# Patient Record
Sex: FEMALE | Race: WHITE | Employment: OTHER | URBAN - METROPOLITAN AREA
[De-identification: names, ages, dates, MRNs, and addresses within clinical notes are randomized per-mention and may not be internally consistent; named-entity substitution may affect disease eponyms.]

---

## 2017-01-13 ENCOUNTER — OFFICE VISIT (OUTPATIENT)
Dept: CARDIOLOGY CLINIC | Age: 77
End: 2017-01-13

## 2017-01-13 VITALS
HEART RATE: 64 BPM | OXYGEN SATURATION: 96 % | DIASTOLIC BLOOD PRESSURE: 70 MMHG | WEIGHT: 106 LBS | SYSTOLIC BLOOD PRESSURE: 120 MMHG | RESPIRATION RATE: 16 BRPM | BODY MASS INDEX: 17.66 KG/M2 | HEIGHT: 65 IN

## 2017-01-13 DIAGNOSIS — Z79.01 CHRONIC ANTICOAGULATION: ICD-10-CM

## 2017-01-13 DIAGNOSIS — D68.51 FACTOR V LEIDEN (HCC): ICD-10-CM

## 2017-01-13 DIAGNOSIS — I10 ESSENTIAL HYPERTENSION: ICD-10-CM

## 2017-01-13 DIAGNOSIS — Z95.818 STATUS POST PLACEMENT OF IMPLANTABLE LOOP RECORDER: Primary | ICD-10-CM

## 2017-01-13 DIAGNOSIS — I63.10 CEREBROVASCULAR ACCIDENT (CVA) DUE TO EMBOLISM OF PRECEREBRAL ARTERY (HCC): ICD-10-CM

## 2017-01-13 NOTE — PROGRESS NOTES
Cardiac Electrophysiology Office Note     Subjective:      Sepideh Duval is a 64 y.o. female patient who was initially seen as consult at [facility] 8/28/16 for new stroke and PAF. Dr Phylliss Spurling is her cardiologist   She is here today for 3 month follow up, she is seen for management of ILR and suspected PAF, hx of CVA. She say she has been doing well. No episodes of fast heart rates, lightheadedness or dizziness. She says this week she has had epigastric burning for 3 days in a row 2 times in the morning after breakfast and yesterday in the afternoon when she was at the movie theater ( no association with food). Burning sensation lasted  For about 30 minutes while at rest. She says TUMs helped relieve the burning. She is not interested in trying a PPI daily. She denies blood in the stool or urine while taking the Eliquis. Past hx:   Hospitalized 8/28/16 at [facility]  MRI 8/30/16 Multifocal areas of acute infarction including left thalamus, left greater than right occipital lobes and right cerebellar hemisphere   She was started on Eliquis in the hospital.  She does have any residual deficits from the three CVAs. She has factor V Leiden mutation.      Past Hx  She and her  said she was on a flight to [place] 5 days ago when she suddenly felt sick, weak, pale and the plane was diverted to [facility]  She thinks she was taken to and kept overnight at [facility]  She was told she had atrial fibrillation transiently and evaluation was ok so sent back home  This morning she felt weak, could not remember things and could not talk clearly so she was referred from [facility], [facility] to ER  Here head CT and neck CT confirmed that she has new acute right occipital and left parietal stroke so Dr Isidro Norton asked me to see her  She had seen Dr Phylliss Spurling in office  She had cardiac cath in 2013 with no CAD, small LAD  LVEF was normal on echo but she has known dilated ascending aorta and moderate AR  She has Factor V Leiden Mutation, hx of uterine cancer and hypertension  She had strokes before June 2007/ November 2015/ 8/2016    Echo 8/30/16 LVEF 55 % to 60 %. No RWMA. Wall thickness was mildly increased. Mild to mod MV. Mod AV. Mid Tr    Patient Active Problem List    Diagnosis Date Noted    Status post placement of implantable loop recorder 11/11/2016    St[facility] 08/28/2016    IPMN (intraductal papillary mucinous neoplasm) 09/02/2015    Spinal stenosis in cervical region 06/26/2014    Chronic constipation 03/06/2014    Cerebral infarction ([facility] 04/13/2012    Hypothyroid 04/13/2012    HTN (hypertension) 09/14/2011     Current Outpatient Prescriptions   Medication Sig Dispense Refill    losartan (COZAAR) 25 mg tablet TAKE 1 TABLET BY MOUTH DAILY. 90 Tab 3    OTHER Pre and probiotic twice a day      MAGNESIUM GLUCONATE 400 mg by Does Not Apply route daily.  apixaban (ELIQUIS) 5 mg tablet Take 1 Tab by mouth two (2) times a day. 60 Tab 6    atorvastatin (LIPITOR) 40 mg tablet Take 1 Tab by mouth nightly. 30 Tab 6    OTHER,NON-FORMULARY, Algenol daily      NATURE-THROID 81.25 mg tab Take 1 Tab by mouth nightly.  b complex vitamins tablet Take 1 Tab by mouth daily.  cholecalciferol (VITAMIN D3) 5,000 unit capsule Take 5,000 Units by mouth daily.        Allergies   Allergen Reactions    Ampicillin Hives    Celebrex [Celecoxib] Hives    Diltiazem Unknown (comments)     Past Medical History   Diagnosis Date    [facility]     Hypertension     Hypothyroid     Migraine     [facility] 6/07     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    Uterine cancer ([facility] 1994     Past Surgical History   Procedure Laterality Date    Pr breast surgery procedure unlisted  4/02, 3/01     L lumpectomy - atypical cells    Hx cataract removal  10/09     right    Hx maile and bso       for uterine cancer     Family History   Problem Relation Age of Onset    Dementia Mother 62    Cancer Father      colon    Diabetes Father      type I    Stroke Sister      Social History   Substance Use Topics    Smoking status: Former Smoker     Packs/day: 25.00     Types: Cigarettes     Quit date: 1/1/1985    Smokeless tobacco: Never Used    Alcohol use 4.8 oz/week     8 Standard drinks or equivalent per week        Review of Systems:   Constitutional: Negative for fever, chills, weight loss, malaise/fatigue. HEENT: Negative for nosebleeds, vision changes. Respiratory: Negative for cough, hemoptysis, sputum production, and wheezing. Cardiovascular: Negative for chest pain, palpitations, orthopnea, claudication, leg swelling, syncope, and PND. Gastrointestinal: Negative for nausea, vomiting, diarrhea, constipation, blood in stool and melena. +GERD  Genitourinary: Negative for dysuria, and hematuria. Musculoskeletal: Negative for myalgias, arthralgia. Skin: Negative for rash. Heme: Does not bleed or bruise easily. Neurological: Negative for speech change and focal weakness     Objective:     Visit Vitals    /70 (BP 1 Location: Left arm, BP Patient Position: Sitting)    Pulse 64    Resp 16    Ht 5' 5\" (1.651 m)    Wt 106 lb (48.1 kg)    SpO2 96%    BMI 17.64 kg/m2      Physical Exam:   Constitutional: thin. No distress. Head: Normocephalic and atraumatic. Eyes: Pupils are equal, round  Neck: supple. No JVD present. Cardiovascular: Normal rate, regular rhythm. Exam reveals no gallop and no friction rub. Pulmonary/Chest: Effort normal and breath sounds normal. No wheezes. Abdominal: Soft flat  Musculoskeletal: no edema. Neurological: alert, oriented. Skin: Skin is warm and dry. ILR scar unremarkable (left of sternum)  Psychiatric: normal mood and affect. Behavior is normal. Judgment and thought content normal.      Assessment/Plan:       ICD-10-CM ICD-9-CM    1. Status post placement of implantable loop recorder Z95.818 V45.09    2.  Cerebrovascular accident (CVA) due to embolism of precerebral artery (HCC) I63.10 434.11    3. Chronic anticoagulation Z79.01 V58.61    4. Essential hypertension I10 401.9    5. Factor V Leiden ([facility] D68.51 289.81      She seems to be doing well, no atrial fibrillation detected thus far on ILR, but her prior stroke appeared to be embolic so will continue to monitor ILR. She is on eliquis for embolic CVA prevention. She is tolerating this without bleeding side effects. BP controlled. She decline PPI at the time for GERD, she would like to take TUMS. Follow-up Disposition:  1 year and monthly ILR monitoring    Thank you for involving me in this patient's care and please call with further concerns or questions. Ashli Meneses M.D.   Electrophysiology/Cardiology  [facility] [place]  [address], [address]  [phone]

## 2017-01-13 NOTE — MR AVS SNAPSHOT
Visit Information Date & Time Provider Department Dept. Phone Encounter #  
 1/13/2017  9:20 AM Nelli Buenrostro MD [facility] [phone] [id number] Your Appointments 6/26/2017 12:00 PM  
ROUTINE CARE with Aftab Burrell MD  
[facility] Appt Note: 6 MONTH F/U  
 [place] [address] [place] [address] [address]  
[address]  
  
    
 7/24/2017 10:00 AM  
ECHO CARDIOGRAMS 2D with ECHO, [facility] ([facility] SCHEDULING) Appt Note: Per Dr. Franci Chapman Echo @10 dx Aortic Regurgitation, See Franci Chapman after  
 [address] [place] [address] [address]  
[address] [facility]  
  
    
 7/24/2017 10:40 AM  
ESTABLISHED PATIENT with Kaylene Fischer MD  
[facility] ([facility] Appt Note: Per Dr. Franci Chapman Echo @10 dx Aortic Regurgitation, See Franci Chapman after  
 [address] [place] [address]  
[address] [address] Upcoming Health Maintenance Date Due COLONOSCOPY 2/1/1958 MEDICARE YEARLY EXAM 3/31/2017 GLAUCOMA SCREENING Q2Y 4/1/2017 Pneumococcal 65+ Low/Medium Risk (2 of 2 - PPSV23) 12/30/2017 DTaP/Tdap/Td series (2 - Td) 5/6/2025 Allergies as of 1/13/2017  Review Complete On: 1/13/2017 By: Milton Shukla Severity Noted Reaction Type Reactions Ampicillin  09/14/2011    Hives Celebrex [Celecoxib]  09/14/2011    Hives Diltiazem  08/28/2016    Unknown (comments) Current Immunizations  Reviewed on 12/2/2013 Name Date Influenza Vaccine  Deferred (Patient Refused) PPD 4/30/2012 Pneumococcal Polysaccharide (PPSV-23)  Deferred (Patient Refused) Tdap 5/6/2015  1:37 PM  
  
 Not reviewed this visit Vitals BP Pulse Resp Height(growth percentile) Weight(growth percentile) SpO2  
 120/70 (BP 1 Location: Left arm, BP Patient Position: Sitting) 64 16 5' 5\" (1.651 m) 106 lb (48.1 kg) 96% BMI OB Status Smoking Status 17.64 kg/m2 Hysterectomy Former Smoker Vitals History BMI and BSA Data Body Mass Index Body Surface Area  
 17.64 kg/m 2 1.49 m 2 Preferred Pharmacy Pharmacy Name Phone [address], [address] [phone] Your Updated Medication List  
  
   
This list is accurate as of: 1/13/17 10:03 AM.  Always use your most recent med list.  
  
  
  
  
 apixaban 5 mg tablet Commonly known as:  Clevester Shawl Take 1 Tab by mouth two (2) times a day. atorvastatin 40 mg tablet Commonly known as:  LIPITOR Take 1 Tab by mouth nightly. b complex vitamins tablet Take 1 Tab by mouth daily. cholecalciferol 5,000 unit capsule Commonly known as:  VITAMIN D3 Take 5,000 Units by mouth daily. losartan 25 mg tablet Commonly known as:  COZAAR  
TAKE 1 TABLET BY MOUTH DAILY. MAGNESIUM GLUCONATE  
400 mg by Does Not Apply route daily. NATURE-THROID 81.25 mg Tab Generic drug:  thyroid (pork) Take 1 Tab by mouth nightly. OTHER Pre and probiotic twice a day OTHER(NON-FORMULARY) Algenol daily Patient Instructions Return to see Dr. Doretha Gonsalves in 1 year. Introducing [facility]! Dear Leodan Chen: Thank you for requesting a [employer] account. Our records indicate that you already have an active [employer] account. You can access your account anytime at [email or web address] Did you know that you can access your hospital and ER discharge instructions at any time in [employer]? You can also review all of your test results from your hospital stay or ER visit. Additional Information If you have questions, please visit the Frequently Asked Questions section of the [employer] website at [email or web address] Remember, [employer] is NOT to be used for urgent needs. For medical emergencies, dial 911. Now available from your iPhone and Android! Please provide this summary of care documentation to your next provider. Your primary care clinician is listed as JORY LAMA. If you have any questions after today's visit, please call [phone].

## 2017-01-13 NOTE — PROGRESS NOTES
Cardiac Electrophysiology Office Note     Subjective:      Niru Ramirez is a 64 y.o. female patient who was initially seen as consult at [facility] 8/28/16 for new stroke and PAF. Dr José Miguel Jaime is her cardiologist    She is here today for 3 month follow up, she is seen for management of ILR and suspected PAF, hx of CVA. She say she has been doing well. No episodes of fast heart rates, lightheadedness or dizziness. She says this week she has had epigastric burning for 3 days in a row 2 times in the morning after breakfast and yesterday in the afternoon when she was at the movie theater ( no association with food). Burning sensation lasted  For about 30 minutes while at rest. She says TUMs helped relieve the burning. She is not interested in trying a PPI daily. She denies blood in the stool or urine while taking the Eliquis. Past hx:   Hospitalized 8/28/16 at [facility]  MRI 8/30/16 Multifocal areas of acute infarction including left thalamus, left greater than right occipital lobes and right cerebellar hemisphere   She was started on Eliquis in the hospital.  She does have any residual deficits from the three CVAs. She has factor V Leiden mutation.      Past Hx  She and her  said she was on a flight to [place] 5 days ago when she suddenly felt sick, weak, pale and the plane was diverted to [facility]  She [facility]  She was told she had atrial fibrillation transiently and evaluation was ok so sent back home  This morning she felt weak, could not remember things and could not talk clearly so she was referred from [facility]  Here head CT and neck CT confirmed that she has new acute right occipital and left parietal stroke so Dr Joaquin Lai asked me to see her  She had seen Dr José Miguel Jaime in office  She had cardiac cath in 2013 with no CAD, small LAD  LVEF was normal on echo but she has known dilated ascending aorta and moderate AR  She has Factor V Leiden Mutation, hx of uterine cancer and hypertension  She had strokes before June 2007/ November 2015/ 8/2016    Echo 8/30/16 LVEF 55 % to 60 %. No RWMA. Wall thickness was mildly increased. Mild to mod MV. Mod AV. Mid Tr    Patient Active Problem List    Diagnosis Date Noted    Status post placement of implantable loop recorder 11/11/2016    Stroke ([facility] 08/28/2016    IPMN (intraductal papillary mucinous neoplasm) 09/02/2015    Spinal stenosis in cervical region 06/26/2014    Chronic constipation 03/06/2014    Cerebral infarction ([facility] 04/13/2012    Hypothyroid 04/13/2012    HTN (hypertension) 09/14/2011     Current Outpatient Prescriptions   Medication Sig Dispense Refill    losartan (COZAAR) 25 mg tablet TAKE 1 TABLET BY MOUTH DAILY. 90 Tab 3    OTHER Pre and probiotic twice a day      MAGNESIUM GLUCONATE 400 mg by Does Not Apply route daily.  apixaban (ELIQUIS) 5 mg tablet Take 1 Tab by mouth two (2) times a day. 60 Tab 6    atorvastatin (LIPITOR) 40 mg tablet Take 1 Tab by mouth nightly. 30 Tab 6    OTHER,NON-FORMULARY, Algenol daily      NATURE-THROID 81.25 mg tab Take 1 Tab by mouth nightly.  b complex vitamins tablet Take 1 Tab by mouth daily.  cholecalciferol (VITAMIN D3) 5,000 unit capsule Take 5,000 Units by mouth daily.        Allergies   Allergen Reactions    Ampicillin Hives    Celebrex [Celecoxib] Hives    Diltiazem Unknown (comments)     Past Medical History   Diagnosis Date    [facility]     Hypertension     Hypothyroid     Migraine     [facility] 6[facility]07     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    Uterine cancer ([facility] 1994     Past Surgical History   Procedure Laterality Date    Pr breast surgery procedure unlisted  4/02, 3/01     L lumpectomy - atypical cells    Hx cataract removal  10/09     right    Hx maile and bso       for uterine cancer     Family History   Problem Relation Age of Onset    Dementia Mother 62    Cancer Father      colon    Diabetes Father      type I    Stroke Sister      Social History   Substance Use Topics    Smoking status: Former Smoker     Packs/day: 25.00     Types: Cigarettes     Quit date: 1/1/1985    Smokeless tobacco: Never Used    Alcohol use 4.8 oz/week     8 Standard drinks or equivalent per week        Review of Systems:   Constitutional: Negative for fever, chills, weight loss, malaise/fatigue. HEENT: Negative for nosebleeds, vision changes. Respiratory: Negative for cough, hemoptysis, sputum production, and wheezing. Cardiovascular: Negative for chest pain, palpitations, orthopnea, claudication, leg swelling, syncope, and PND. Gastrointestinal: Negative for nausea, vomiting, diarrhea, constipation, blood in stool and melena. +GERD  Genitourinary: Negative for dysuria, and hematuria. Musculoskeletal: Negative for myalgias, arthralgia. Skin: Negative for rash. Heme: Does not bleed or bruise easily. Neurological: Negative for speech change and focal weakness     Objective:     Visit Vitals    /70 (BP 1 Location: Left arm, BP Patient Position: Sitting)    Pulse 64    Resp 16    Ht 5' 5\" (1.651 m)    Wt 106 lb (48.1 kg)    SpO2 96%    BMI 17.64 kg/m2      Physical Exam:   Constitutional: well-developed and thin. No distress. Head: Normocephalic and atraumatic. Eyes: Pupils are equal, round  Neck: supple. No JVD present. Cardiovascular: Normal rate, regular rhythm. Exam reveals no gallop and no friction rub. Pulmonary/Chest: Effort normal and breath sounds normal. No wheezes. Abdominal: Soft, no tenderness. Musculoskeletal: no edema. Neurological: alert,oriented. Skin: Skin is warm and dry. ILR scar unremarkable (left of sternum)  Psychiatric: normal mood and affect. Behavior is normal. Judgment and thought content normal.      EKG, old: Sinus  Rhythm  -artifacts  -  Nonspecific T-abnormality.        Assessment/Plan:       ICD-10-CM ICD-9-CM    1. Status post placement of implantable loop recorder Z95.818 V45.09    2. Cerebrovascular accident (CVA) due to embolism of precerebral artery ([facility] I63.10 434.11    3. Chronic anticoagulation Z79.01 V58.61    4. Essential hypertension I10 401.9    5. Factor V Leiden ([facility] D68.51 289.81        She seems to be doing well, no atrial fibrillation detected thus far on ILR, will continue to monitor ILR. She is on eliquis for embolic CVA prevention. She is tolerating this without bleeding side effects. BP controlled. She decline PPI at the time for GERD, she would like to take TUMS. Follow-up Disposition:  1 year and monthly ILR monitoring    Thank you for involving me in this patient's care and please call with further concerns or questions. Wolfgang Merlin, M.D.   Electrophysiology/Cardiology  [facility] [place]  [facility], [facility] [address] [place] [address]  [phone]

## 2017-01-17 ENCOUNTER — OFFICE VISIT (OUTPATIENT)
Dept: INTERNAL MEDICINE CLINIC | Age: 77
End: 2017-01-17

## 2017-01-17 VITALS
WEIGHT: 106.8 LBS | TEMPERATURE: 98.5 F | HEIGHT: 65 IN | BODY MASS INDEX: 17.79 KG/M2 | RESPIRATION RATE: 17 BRPM | OXYGEN SATURATION: 95 % | HEART RATE: 85 BPM

## 2017-01-17 DIAGNOSIS — K21.9 GASTROESOPHAGEAL REFLUX DISEASE WITHOUT ESOPHAGITIS: ICD-10-CM

## 2017-01-17 DIAGNOSIS — K59.09 CHRONIC CONSTIPATION: Primary | ICD-10-CM

## 2017-01-17 NOTE — PATIENT INSTRUCTIONS

## 2017-01-17 NOTE — PROGRESS NOTES
HISTORY OF PRESENT ILLNESS  Maya So is a 64 y.o. female. HPI  Having reflux/indigestion. Burning in chest for last couple of months. Occurring a couple times a week but none since Wednesday. Has not changed her diet - no known food triggers. Has TUMS to take prn which helps. Feels has some trouble swallowing. Worse in the am.  Feels food stuck in upper esophagus - clears throat a couple of times and then resolves. Does not occur every day. Has cut back on her coffee - now having 1  -2 cups a week. No abdominal pains. Having issues with constipation. Takes Smooth-lax 2 nights a week - has 2 large BM post Smooth-lax. Will have another small BM mid week, then usually requires Smooth-lax again. No BRBPR or melena. Similar bowel pattern for last 2-3 years. Weight is unchanged. Current Outpatient Prescriptions:     losartan (COZAAR) 25 mg tablet, TAKE 1 TABLET BY MOUTH DAILY. , Disp: 90 Tab, Rfl: 3    OTHER, Pre and probiotic twice a day, Disp: , Rfl:     MAGNESIUM GLUCONATE, 400 mg by Does Not Apply route daily. , Disp: , Rfl:     apixaban (ELIQUIS) 5 mg tablet, Take 1 Tab by mouth two (2) times a day., Disp: 60 Tab, Rfl: 6    atorvastatin (LIPITOR) 40 mg tablet, Take 1 Tab by mouth nightly., Disp: 30 Tab, Rfl: 6    OTHER,NON-FORMULARY,, Algenol daily, Disp: , Rfl:     NATURE-THROID 81.25 mg tab, Take 1 Tab by mouth nightly., Disp: , Rfl:     b complex vitamins tablet, Take 1 Tab by mouth daily. , Disp: , Rfl:     cholecalciferol (VITAMIN D3) 5,000 unit capsule, Take 5,000 Units by mouth daily. , Disp: , Rfl:     Visit Vitals    Pulse 85    Temp 98.5 °F (36.9 °C) (Oral)    Resp 17    Ht 5' 5\" (1.651 m)    Wt 106 lb 12.8 oz (48.4 kg)    SpO2 95%    BMI 17.77 kg/m2       ROS  See above  Physical Exam   Constitutional: She appears well-developed and well-nourished. HENT:   Head: Normocephalic and atraumatic. Neck: Neck supple. No thyromegaly present.    Cardiovascular: Normal rate, regular rhythm and normal heart sounds. Exam reveals no gallop and no friction rub. No murmur heard. Pulmonary/Chest: Effort normal and breath sounds normal.   Abdominal: Soft. Bowel sounds are normal. She exhibits no distension and no mass. There is no tenderness. Musculoskeletal: She exhibits no edema. Lymphadenopathy:     She has no cervical adenopathy. Vitals reviewed. ASSESSMENT and PLAN  GERD - tums as needed. Discussed apple cider vineager. To let me know if occurring > 2 times a week. Discussed food triggers and hand out provided. Constipation - controlled, cont same  No orders of the defined types were placed in this encounter.     Follow-up Disposition: Not on File

## 2017-01-17 NOTE — MR AVS SNAPSHOT
Visit Information Date & Time Provider Department Dept. Phone Encounter #  
 1/17/2017  9:20 AM Talon Hernandez MD [facility] [phone] [id number] Your Appointments 6/26/2017 12:00 PM  
ROUTINE CARE with Talon Hernandez MD  
[facility]) Appt Note: 6 MONTH F/U  
 [place] [address] [facility] [address]  
[address]  
  
    
 7/24/2017 10:00 AM  
ECHO CARDIOGRAMS 2D with ECHO, [facility] ([facility] SCHEDULING) Appt Note: Per Dr. Valdez Addison Echo @10 dx Aortic Regurgitation, See Sharvalery Addison after  
 [address] [facility] [address]  
[address] [facility]  
  
    
 7/24/2017 10:40 AM  
ESTABLISHED PATIENT with Vanessa Roger MD  
[facility] ([facility]) Appt Note: Per Dr. Valdez Addison Echo @10 dx Aortic Regurgitation, See Sharlidori Addison after  
 [address] [place] [address]  
[address] [address] Upcoming Health Maintenance Date Due COLONOSCOPY 2/1/1958 MEDICARE YEARLY EXAM 3/31/2017 GLAUCOMA SCREENING Q2Y 4/1/2017 Pneumococcal 65+ Low/Medium Risk (2 of 2 - PPSV23) 12/30/2017 DTaP/Tdap/Td series (2 - Td) 5/6/2025 Allergies as of 1/17/2017  Review Complete On: 1/17/2017 By: Talon Hernandez MD  
  
 Severity Noted Reaction Type Reactions Ampicillin  09/14/2011    Hives Celebrex [Celecoxib]  09/14/2011    Hives Diltiazem  08/28/2016    Unknown (comments) Current Immunizations  Reviewed on 12/2/2013 Name Date Influenza Vaccine  Deferred (Patient Refused) PPD 4/30/2012 Pneumococcal Polysaccharide (PPSV-23)  Deferred (Patient Refused) Tdap 5/6/2015  1:37 PM  
  
 Not reviewed this visit You Were Diagnosed With   
  
 Codes Comments Chronic constipation    -  Primary ICD-10-CM: K59.09 
ICD-9-CM: 564.00 Gastroesophageal reflux disease without esophagitis     ICD-10-CM: K21.9 ICD-9-CM: 530.81 Vitals Pulse Temp Resp Height(growth percentile) Weight(growth percentile) SpO2  
 85 98.5 °F (36.9 °C) (Oral) 17 5' 5\" (1.651 m) 106 lb 12.8 oz (48.4 kg) 95% BMI OB Status Smoking Status 17.77 kg/m2 Hysterectomy Former Smoker Vitals History BMI and BSA Data Body Mass Index Body Surface Area  
 17.77 kg/m 2 1.49 m 2 Preferred Pharmacy Pharmacy Name Phone [address], [address] [facility] [address] [phone] Your Updated Medication List  
  
   
This list is accurate as of: 1/17/17  9:53 AM.  Always use your most recent med list.  
  
  
  
  
 apixaban 5 mg tablet Commonly known as:  Sherlean [place] Take 1 Tab by mouth two (2) times a day. atorvastatin 40 mg tablet Commonly known as:  LIPITOR Take 1 Tab by mouth nightly. b complex vitamins tablet Take 1 Tab by mouth daily. cholecalciferol 5,000 unit capsule Commonly known as:  VITAMIN D3 Take 5,000 Units by mouth daily. losartan 25 mg tablet Commonly known as:  COZAAR  
TAKE 1 TABLET BY MOUTH DAILY. MAGNESIUM GLUCONATE  
400 mg by Does Not Apply route daily. NATURE-THROID 81.25 mg Tab Generic drug:  thyroid (pork) Take 1 Tab by mouth nightly. OTHER Pre and probiotic twice a day OTHER(NON-FORMULARY) Algenol daily Patient Instructions Gastroesophageal Reflux Disease (GERD): Care Instructions Your Care Instructions Gastroesophageal reflux disease (GERD) is the backward flow of stomach acid into the esophagus. The esophagus is the tube that leads from your throat to your stomach. A one-way valve prevents the stomach acid from moving up into this tube. When you have GERD, this valve does not close tightly enough. If you have mild GERD symptoms including heartburn, you may be able to control the problem with antacids or over-the-counter medicine. Changing your diet, losing weight, and making other lifestyle changes can also help reduce symptoms. Follow-up care is a key part of your treatment and safety. Be sure to make and go to all appointments, and call your doctor if you are having problems. Its also a good idea to know your test results and keep a list of the medicines you take. How can you care for yourself at home? · Take your medicines exactly as prescribed. Call your doctor if you think you are having a problem with your medicine. · Your doctor may recommend over-the-counter medicine. For mild or occasional indigestion, antacids, such as Tums, Gaviscon, Mylanta, or Maalox, may help. Your doctor also may recommend over-the-counter acid reducers, such as Pepcid AC, Tagamet HB, Zantac 75, or Prilosec. Read and follow all instructions on the label. If you use these medicines often, talk with your doctor. · Change your eating habits. ¨ Its best to eat several small meals instead of two or three large meals. ¨ After you eat, wait 2 to 3 hours before you lie down. ¨ Chocolate, mint, and alcohol can make GERD worse. ¨ Spicy foods, foods that have a lot of acid (like tomatoes and oranges), and coffee can make GERD symptoms worse in some people. If your symptoms are worse after you eat a certain food, you may want to stop eating that food to see if your symptoms get better. · Do not smoke or chew tobacco. Smoking can make GERD worse. If you need help quitting, talk to your doctor about stop-smoking programs and medicines. These can increase your chances of quitting for good. · If you have GERD symptoms at night, raise the head of your bed 6 to 8 inches by putting the frame on blocks or placing a foam wedge under the head of your mattress. (Adding extra pillows does not work.) · Do not wear tight clothing around your middle. · Lose weight if you need to. Losing just 5 to 10 pounds can help. When should you call for help? Call your doctor now or seek immediate medical care if: 
· You have new or different belly pain. · Your stools are black and tarlike or have streaks of blood. Watch closely for changes in your health, and be sure to contact your doctor if: 
· Your symptoms have not improved after 2 days. · Food seems to catch in your throat or chest. 
Where can you learn more? Go to http://heidy-damir.info/. Enter Y304 in the search box to learn more about \"Gastroesophageal Reflux Disease (GERD): Care Instructions. \" Current as of: August 9, 2016 Content Version: 11.1 © 7960-8516 [employer]. Care instructions adapted under license by [employer] (which disclaims liability or warranty for this information). If you have questions about a medical condition or this instruction, always ask your healthcare professional. [place] [address] any warranty or liability for your use of this information. Introducing \[facility] [place] [facility]\"" & HEALTH SERVICES! Dear Kumar Hoyt: Thank you for requesting a Seamless Receipts account. Our records indicate that you already have an active Seamless Receipts account. You can access your account anytime at [email or web address] Brew [email or web address]. [email or web address]/Button Brew House Did you know that you can access your hospital and ER discharge instructions at any time in Seamless Receipts? You can also review all of your test results from your hospital stay or ER visit. Additional Information If you have questions, please visit the Frequently Asked Questions section of the Seamless Receipts website at [email or web address]://Button Brew [email or web address] EXPO/Button Brew [email or web address]/. Remember, Seamless Receipts is NOT to be used for urgent needs. For medical emergencies, dial 911. Now available from your iPhone and Android! Please provide this summary of care documentation to your next provider. Your primary care clinician is listed as JORY LAMA. If you have any questions after today's visit, please call [phone].

## 2017-01-19 ENCOUNTER — TELEPHONE (OUTPATIENT)
Dept: CARDIOLOGY CLINIC | Age: 77
End: 2017-01-19

## 2017-01-19 NOTE — TELEPHONE ENCOUNTER
Pt calling to speak with Desire Sinclair about a light on her pacer machine. She can be reached at [phone].  [employer]

## 2017-01-20 NOTE — TELEPHONE ENCOUNTER
Spoke to pt regarding her CL for her Linq. She stated that it is only saying [employer] on screen & nothing else. Had pt unplug then send a manual transmission. Everything working fine now.

## 2017-01-26 ENCOUNTER — TELEPHONE (OUTPATIENT)
Dept: INTERNAL MEDICINE CLINIC | Age: 77
End: 2017-01-26

## 2017-01-26 NOTE — TELEPHONE ENCOUNTER
Pt stated the physical therapist has faxed a prescription to continue therapy on both shoulders. Requested that script is reviewed and faxed back to [phone]. Pt has an upcoming appointment on 2.1. 17.              From answering service

## 2017-01-31 ENCOUNTER — TELEPHONE (OUTPATIENT)
Dept: INTERNAL MEDICINE CLINIC | Age: 77
End: 2017-01-31

## 2017-01-31 NOTE — TELEPHONE ENCOUNTER
Kelsi Patrick is calling regarding fax sent yesterday. She would just like clarification on if there is any additional information needed.    Thank you,  Stephany Villeda

## 2017-02-01 NOTE — TELEPHONE ENCOUNTER
YVONNE. We did not receive any fax on this patient. Asked for information to be faxed again, asked if there was any additional information needed, advised that acm is out of the office until next week and gave fax number just in case.

## 2017-02-07 ENCOUNTER — OFFICE VISIT (OUTPATIENT)
Dept: CARDIOLOGY CLINIC | Age: 77
End: 2017-02-07

## 2017-02-07 DIAGNOSIS — Z95.818 STATUS POST PLACEMENT OF IMPLANTABLE LOOP RECORDER: Primary | ICD-10-CM

## 2017-02-15 ENCOUNTER — DOCUMENTATION ONLY (OUTPATIENT)
Dept: CARDIOLOGY CLINIC | Age: 77
End: 2017-02-15

## 2017-02-15 NOTE — PROGRESS NOTES
ILR showed PAF with RVR 2/6/17  Stop losartan and given toprol XL 50 mg every day  Continue to monitor

## 2017-02-17 RX ORDER — METOPROLOL SUCCINATE 50 MG/1
50 TABLET, EXTENDED RELEASE ORAL DAILY
Qty: 30 TAB | Refills: 3 | Status: SHIPPED | OUTPATIENT
Start: 2017-02-17 | End: 2017-06-08 | Stop reason: SDUPTHER

## 2017-02-17 NOTE — PROGRESS NOTES
Spoke with Ms. Hameedy regarding recommendation by Dr. Alejandrina Sloan to d/c losartan and begin Toprol XL 50mg once daily. Prescription sent to pharmacy per verbal order from Dr. Alejandrina Sloan. The patient verbalized understanding and will call our office with any further questions or concerns.

## 2017-03-07 ENCOUNTER — HOSPITAL ENCOUNTER (OUTPATIENT)
Dept: GENERAL RADIOLOGY | Age: 77
Discharge: HOME | End: 2017-03-07
Attending: OTOLARYNGOLOGY
Payer: MEDICARE

## 2017-03-07 DIAGNOSIS — R13.10 DYSPHAGIA: ICD-10-CM

## 2017-03-07 DIAGNOSIS — Z78.9 NONSMOKER: ICD-10-CM

## 2017-03-07 DIAGNOSIS — H60.62 CHRONIC INFECTION OF LEFT EXTERNAL EAR: ICD-10-CM

## 2017-03-07 PROCEDURE — 74220 X-RAY XM ESOPHAGUS 1CNTRST: CPT

## 2017-04-06 ENCOUNTER — OFFICE VISIT (OUTPATIENT)
Dept: INTERNAL MEDICINE CLINIC | Age: 77
End: 2017-04-06

## 2017-04-06 VITALS
OXYGEN SATURATION: 97 % | SYSTOLIC BLOOD PRESSURE: 131 MMHG | HEART RATE: 70 BPM | TEMPERATURE: 98.1 F | RESPIRATION RATE: 16 BRPM | DIASTOLIC BLOOD PRESSURE: 89 MMHG | WEIGHT: 107 LBS | BODY MASS INDEX: 17.83 KG/M2 | HEIGHT: 65 IN

## 2017-04-06 DIAGNOSIS — Z01.818 PREOP EXAMINATION: ICD-10-CM

## 2017-04-06 DIAGNOSIS — H25.9 AGE-RELATED CATARACT OF LEFT EYE, UNSPECIFIED AGE-RELATED CATARACT TYPE: Primary | ICD-10-CM

## 2017-04-06 NOTE — MR AVS SNAPSHOT
Visit Information Date & Time Provider Department Dept. Phone Encounter #  
 4/6/2017 12:20 PM Al Brito MD [facility] [phone] [id number] Your Appointments 6/26/2017 12:00 PM  
ROUTINE CARE with Al Brito MD  
[facility]) Appt Note: 6 MONTH F/U  
 [place] [address] [facility] [address]  
[address]  
  
    
 7/24/2017 10:00 AM  
ECHO CARDIOGRAMS 2D with ECHO, [facility] ([facility]) Appt Note: Per Dr. Gena Arechiga Echo @10 dx Aortic Regurgitation, See [address]a [address] after  
 [address] [facility] [address]  
[address] [facility]  
  
    
 7/24/2017 10:40 AM  
ESTABLISHED PATIENT with Delicia Laguerre MD  
[facility] ([facility]) Appt Note: Per Dr. Gena Arechiga Echo @10 dx Aortic Regurgitation, See [address] after  
 [address] [place] [address]  
[address] [address] Upcoming Health Maintenance Date Due COLONOSCOPY 2/1/1958 MEDICARE YEARLY EXAM 3/31/2017 GLAUCOMA SCREENING Q2Y 4/1/2017 Pneumococcal 65+ Low/Medium Risk (2 of 2 - PPSV23) 12/30/2017 DTaP/Tdap/Td series (2 - Td) 5/6/2025 Allergies as of 4/6/2017  Review Complete On: 4/6/2017 By: Al Brito MD  
  
 Severity Noted Reaction Type Reactions Ampicillin  09/14/2011    Hives Celebrex [Celecoxib]  09/14/2011    Hives Diltiazem  08/28/2016    Unknown (comments) Current Immunizations  Reviewed on 12/2/2013 Name Date Influenza Vaccine  Deferred (Patient Refused) PPD 4/30/2012 Pneumococcal Polysaccharide (PPSV-23)  Deferred (Patient Refused) Tdap 5/6/2015  1:37 PM  
  
 Not reviewed this visit Vitals BP Pulse Temp Resp Height(growth percentile) Weight(growth percentile) 131/89 (BP 1 Location: Left arm, BP Patient Position: Sitting) 70 98.1 °F (36.7 °C) (Oral) 16 5' 5\" (1.651 m) 107 lb (48.5 kg) SpO2 BMI OB Status Smoking Status 97% 17.81 kg/m2 Hysterectomy Former Smoker Vitals History BMI and BSA Data Body Mass Index Body Surface Area  
 17.81 kg/m 2 1.49 m 2 Preferred Pharmacy Pharmacy Name Phone [address], [address] [phone] Your Updated Medication List  
  
   
This list is accurate as of: 4/6/17  1:07 PM.  Always use your most recent med list.  
  
  
  
  
 apixaban 5 mg tablet Commonly known as:  Jennifer Ee Take 1 Tab by mouth two (2) times a day. atorvastatin 40 mg tablet Commonly known as:  LIPITOR Take 1 Tab by mouth nightly. b complex vitamins tablet Take 1 Tab by mouth daily. cholecalciferol 5,000 unit capsule Commonly known as:  VITAMIN D3 Take 5,000 Units by mouth daily. MAGNESIUM GLUCONATE  
400 mg by Does Not Apply route daily. metoprolol succinate 50 mg XL tablet Commonly known as:  TOPROL-XL Take 1 Tab by mouth daily. NATURE-THROID 81.25 mg Tab Generic drug:  thyroid (pork) Take 1 Tab by mouth nightly. OTHER Pre and probiotic twice a day OTHER(NON-FORMULARY) Algenol daily Introducing [facility] & HEALTH SERVICES! Dear Beny Delatorre: Thank you for requesting a Pick1 account. Our records indicate that you already have an active Pick1 account. You can access your account anytime at [email or web address] [email or web address]. [email or web address]/Volar[employer]visors Did you know that you can access your hospital and ER discharge instructions at any time in Pick1? You can also review all of your test results from your hospital stay or ER visit. Additional Information If you have questions, please visit the Frequently Asked Questions section of the Rewalon website at [email or web address] The [email or web address]. [email or web address] Remember, Rewalon is NOT to be used for urgent needs. For medical emergencies, dial 911. Now available from your iPhone and Android! Please provide this summary of care documentation to your next provider. Your primary care clinician is listed as JORY LAMA. If you have any questions after today's visit, please call [phone].

## 2017-04-21 RX ORDER — ATORVASTATIN CALCIUM 40 MG/1
TABLET, FILM COATED ORAL
Qty: 30 TAB | Refills: 11 | Status: SHIPPED | OUTPATIENT
Start: 2017-04-21 | End: 2017-08-21 | Stop reason: SDUPTHER

## 2017-05-17 ENCOUNTER — HOSPITAL ENCOUNTER (OUTPATIENT)
Dept: MAMMOGRAPHY | Age: 77
Discharge: HOME | End: 2017-05-17
Attending: FAMILY MEDICINE
Payer: MEDICARE

## 2017-05-17 DIAGNOSIS — M81.0 OSTEOPOROSIS, POST-MENOPAUSAL: ICD-10-CM

## 2017-05-17 DIAGNOSIS — M48.50XA VERTEBRAL COMPRESSION FRACTURE (HCC): ICD-10-CM

## 2017-05-17 PROCEDURE — 77080 DXA BONE DENSITY AXIAL: CPT

## 2017-05-19 ENCOUNTER — TELEPHONE (OUTPATIENT)
Dept: CARDIOLOGY CLINIC | Age: 77
End: 2017-05-19

## 2017-05-19 NOTE — TELEPHONE ENCOUNTER
Ismaelariel Guidry calling in reference to her [employer] monitor. She stated it was making a lot of noise so she unplugged it. She requested a call back to [phone]. Thanks!

## 2017-05-22 NOTE — TELEPHONE ENCOUNTER
Spoke to pt about her [employer] for her Linq. Went over how it works again since she unplugged it since the light kept coming on. Told her to plug it back in & to put something over the screen if the light is bothering her. Informed her that she doesn't need to do anything with the monitor unless we call & tell her to do a transmission. Pt stated she understands.

## 2017-06-05 ENCOUNTER — OFFICE VISIT (OUTPATIENT)
Dept: INTERNAL MEDICINE CLINIC | Age: 77
End: 2017-06-05

## 2017-06-05 VITALS
SYSTOLIC BLOOD PRESSURE: 140 MMHG | HEIGHT: 65 IN | TEMPERATURE: 97.9 F | HEART RATE: 70 BPM | DIASTOLIC BLOOD PRESSURE: 80 MMHG | WEIGHT: 104 LBS | BODY MASS INDEX: 17.33 KG/M2 | RESPIRATION RATE: 20 BRPM | OXYGEN SATURATION: 99 %

## 2017-06-05 DIAGNOSIS — G47.00 INSOMNIA, UNSPECIFIED TYPE: ICD-10-CM

## 2017-06-05 DIAGNOSIS — K21.9 GASTROESOPHAGEAL REFLUX DISEASE WITHOUT ESOPHAGITIS: Primary | ICD-10-CM

## 2017-06-05 RX ORDER — RANITIDINE 150 MG/1
150 TABLET, FILM COATED ORAL
COMMUNITY
End: 2017-06-16 | Stop reason: ALTCHOICE

## 2017-06-05 NOTE — PROGRESS NOTES
HISTORY OF PRESENT ILLNESS  Maya So is a 76 y.o. female. HPI  Patient presents to the office for evaluation of GERD like symptoms. She reports Last Wednesday after dinner she noticed some burning in her throat area. She reports she had some scotch that night but this is not uncommon for her. She admits to drinking coffee and she likes to have chocolate. It was suggested by one of the nurses at her facility to try the zantac and change her diet. She reports she has done this and she is feeling a little better. She has been having some insomnia as well. She denies seeing blood. No increase of gas. Review of Systems   Gastrointestinal: Positive for heartburn. Negative for abdominal pain, nausea and vomiting. Psychiatric/Behavioral: The patient has insomnia. Blood pressure 140/80, pulse 70, temperature 97.9 °F (36.6 °C), temperature source Oral, resp. rate 20, height 5' 5\" (1.651 m), weight 104 lb (47.2 kg), SpO2 99 %. Physical Exam   Constitutional: She appears well-developed and well-nourished. No distress. Abdominal: Soft. Bowel sounds are normal. She exhibits no distension. There is no tenderness. There is no rebound. ASSESSMENT and PLAN    Maya was seen today for epigastric pain. Diagnoses and all orders for this visit:    Gastroesophageal reflux disease without esophagitis    Insomnia, unspecified type      I gave the patient today an informational sheet about GERD and foods she should avoid. She has been instructed to continue the zantac. She will be coming back next Friday to follow up with Dr. Zurdo Steinberg. At that time she will let her know if symptoms are resolved with medication and holding trigger foods.

## 2017-06-05 NOTE — PROGRESS NOTES
Reviewed record in preparation for visit and have obtained necessary documentation. Identified pt with two pt identifiers(name and ). Health Maintenance Due   Topic    COLONOSCOPY     MEDICARE YEARLY EXAM     GLAUCOMA SCREENING Q2Y          No chief complaint on file. Wt Readings from Last 3 Encounters:   17 104 lb (47.2 kg)   17 107 lb (48.5 kg)   17 106 lb 12.8 oz (48.4 kg)     Temp Readings from Last 3 Encounters:   17 97.9 °F (36.6 °C) (Oral)   17 98.1 °F (36.7 °C) (Oral)   17 98.5 °F (36.9 °C) (Oral)     BP Readings from Last 3 Encounters:   17 131/89   17 120/70   16 120/75     Pulse Readings from Last 3 Encounters:   17 70   17 85   17 64           Learning Assessment:  :     Learning Assessment 2015   PRIMARY LEARNER Patient Patient   HIGHEST LEVEL OF EDUCATION - PRIMARY LEARNER  4 YEARS OF COLLEGE > 4 YEARS OF COLLEGE   BARRIERS PRIMARY LEARNER NONE NONE   CO-LEARNER CAREGIVER No -   PRIMARY LANGUAGE ENGLISH ENGLISH    NEED No -   LEARNER PREFERENCE PRIMARY READING READING   ANSWERED BY patient self   RELATIONSHIP SELF SELF       Depression Screening:  :     PHQ over the last two weeks 2017   Little interest or pleasure in doing things Not at all   Feeling down, depressed or hopeless Not at all   Total Score PHQ 2 0       Fall Risk Assessment:  :     Fall Risk Assessment, last 12 mths 2017   Able to walk? Yes   Fall in past 12 months? No   Fall with injury? -   Number of falls in past 12 months -   Fall Risk Score -       Abuse Screening:  :     Abuse Screening Questionnaire 2017 3/30/2016 2015   Do you ever feel afraid of your partner? N N N   Are you in a relationship with someone who physically or mentally threatens you? N N N   Is it safe for you to go home?  Stacy Castellon       Coordination of Care Questionnaire:  :     1) Have you been to an emergency room, urgent care clinic since your last visit? no   Hospitalized since your last visit? no             2) Have you seen or consulted any other health care providers outside of [address] [place] [address] since your last visit? yes  (Include any pap smears or colon screenings in this section.)    3) Do you have an Advance Directive on file? yes    4) Are you interested in receiving information on Advance Directives? NO      Patient is accompanied by self I have received verbal consent from [address] [place] [address] Street to discuss any/all medical information while they are present in the room.

## 2017-06-05 NOTE — MR AVS SNAPSHOT
Visit Information Date & Time Provider Department Dept. Phone Encounter #  
 6/5/2017  2:00 PM Celia Garza PA-C [facility] [phone] [id number] Your Appointments 6/26/2017 12:00 PM  
ROUTINE CARE with Rosario Lara MD  
[facility]) Appt Note: 6 MONTH F/U  
 [address] [place] [address] [facility] [address]  
[address]  
  
    
 7/24/2017 10:00 AM  
ECHO CARDIOGRAMS 2D with ECHO, [facility] ([facility]) Appt Note: Per Dr. Phong Brown Echo @10 dx Aortic Regurgitation, See Phong Brown after  
 [address] [facility] [address]  
[address] [facility]  
  
    
 7/24/2017 10:40 AM  
ESTABLISHED PATIENT with Manuel Primrose, MD  
[facility] ([facility]) Appt Note: Per Dr. Phong Brown Echo @10 dx Aortic Regurgitation, See Phong Brown after  
 [address] [place] [address]  
[address] [address] Upcoming Health Maintenance Date Due COLONOSCOPY 2/1/1958 MEDICARE YEARLY EXAM 3/31/2017 GLAUCOMA SCREENING Q2Y 4/1/2017 INFLUENZA AGE 9 TO ADULT 8/1/2017 Pneumococcal 65+ Low/Medium Risk (2 of 2 - PPSV23) 12/30/2017 DTaP/Tdap/Td series (2 - Td) 5/6/2025 Allergies as of 6/5/2017  In Progress On: 6/5/2017 By: Sona Leung LPN Severity Noted Reaction Type Reactions Ampicillin  09/14/2011    Hives Celebrex [Celecoxib]  09/14/2011    Hives Diltiazem  08/28/2016    Unknown (comments) Current Immunizations  Reviewed on 12/2/2013 Name Date Influenza Vaccine  Deferred (Patient Refused) PPD 4/30/2012 Pneumococcal Polysaccharide (PPSV-23)  Deferred (Patient Refused) Tdap 5/6/2015  1:37 PM  
  
 Not reviewed this visit Vitals BP Pulse Temp Resp Height(growth percentile) Weight(growth percentile) 140/80 70 97.9 °F (36.6 °C) (Oral) 20 5' 5\" (1.651 m) 104 lb (47.2 kg) SpO2 BMI OB Status Smoking Status 99% 17.31 kg/m2 Hysterectomy Former Smoker Vitals History BMI and BSA Data Body Mass Index Body Surface Area  
 17.31 kg/m 2 1.47 m 2 Preferred Pharmacy Pharmacy Name Phone [address], [address] [phone] Your Updated Medication List  
  
   
This list is accurate as of: 6/5/17  3:18 PM.  Always use your most recent med list.  
  
  
  
  
 apixaban 5 mg tablet Commonly known as:  Cyndia Abbi Take 1 Tab by mouth two (2) times a day. atorvastatin 40 mg tablet Commonly known as:  LIPITOR  
TAKE 1 TABLET BY MOUTH NIGHTLY. b complex vitamins tablet Take 1 Tab by mouth daily. cholecalciferol 5,000 unit capsule Commonly known as:  VITAMIN D3 Take 5,000 Units by mouth daily. MAGNESIUM GLUCONATE  
400 mg by Does Not Apply route daily. metoprolol succinate 50 mg XL tablet Commonly known as:  TOPROL-XL Take 1 Tab by mouth daily. NATURE-THROID 81.25 mg Tab Generic drug:  thyroid (pork) Take 1 Tab by mouth nightly. OTHER Pre and probiotic twice a day OTHER(NON-FORMULARY) Algenol daily ZANTAC 150 mg tablet Generic drug:  raNITIdine Take 150 mg by mouth Before breakfast and dinner. Introducing [facility] & HEALTH SERVICES! Dear Ifeanyi Castro: Thank you for requesting a [email or web address] account. Our records indicate that you already have an active [email or web address] account. You can access your account anytime at [email or web address]. [email or web address]/[employer] Did you know that you can access your hospital and ER discharge instructions at any time in [email or web address]? You can also review all of your test results from your hospital stay or ER visit. Additional Information If you have questions, please visit the Frequently Asked Questions section of the Kiptronichart website at [email or web address] Remember, Solaborate is NOT to be used for urgent needs. For medical emergencies, dial 911. Now available from your iPhone and Android! Please provide this summary of care documentation to your next provider. Your primary care clinician is listed as JORY LAMA. If you have any questions after today's visit, please call [phone].

## 2017-06-09 RX ORDER — METOPROLOL SUCCINATE 50 MG/1
TABLET, EXTENDED RELEASE ORAL
Qty: 30 TAB | Refills: 0 | Status: SHIPPED | OUTPATIENT
Start: 2017-06-09 | End: 2017-07-07 | Stop reason: SDUPTHER

## 2017-06-12 ENCOUNTER — OFFICE VISIT (OUTPATIENT)
Dept: CARDIOLOGY CLINIC | Age: 77
End: 2017-06-12

## 2017-06-12 DIAGNOSIS — Z95.818 STATUS POST PLACEMENT OF IMPLANTABLE LOOP RECORDER: Primary | ICD-10-CM

## 2017-06-16 ENCOUNTER — OFFICE VISIT (OUTPATIENT)
Dept: INTERNAL MEDICINE CLINIC | Age: 77
End: 2017-06-16

## 2017-06-16 VITALS
WEIGHT: 103.6 LBS | HEIGHT: 65 IN | OXYGEN SATURATION: 98 % | RESPIRATION RATE: 16 BRPM | TEMPERATURE: 98.1 F | DIASTOLIC BLOOD PRESSURE: 90 MMHG | SYSTOLIC BLOOD PRESSURE: 136 MMHG | HEART RATE: 71 BPM | BODY MASS INDEX: 17.26 KG/M2

## 2017-06-16 DIAGNOSIS — E78.00 PURE HYPERCHOLESTEROLEMIA: ICD-10-CM

## 2017-06-16 DIAGNOSIS — I10 ESSENTIAL HYPERTENSION: ICD-10-CM

## 2017-06-16 DIAGNOSIS — R63.4 WEIGHT LOSS: Primary | ICD-10-CM

## 2017-06-16 DIAGNOSIS — Z13.39 SCREENING FOR ALCOHOLISM: ICD-10-CM

## 2017-06-16 DIAGNOSIS — E03.9 ACQUIRED HYPOTHYROIDISM: ICD-10-CM

## 2017-06-16 DIAGNOSIS — Z00.00 ROUTINE GENERAL MEDICAL EXAMINATION AT A HEALTH CARE FACILITY: ICD-10-CM

## 2017-06-16 DIAGNOSIS — I63.10 CEREBROVASCULAR ACCIDENT (CVA) DUE TO EMBOLISM OF PRECEREBRAL ARTERY (HCC): ICD-10-CM

## 2017-06-16 DIAGNOSIS — Z71.89 ADVANCE DIRECTIVE DISCUSSED WITH PATIENT: ICD-10-CM

## 2017-06-16 NOTE — PROGRESS NOTES
Health Maintenance Due   Topic Date Due    COLONOSCOPY  02/01/1958    MEDICARE YEARLY EXAM  03/31/2017    GLAUCOMA SCREENING Q2Y  04/01/2017       Chief Complaint   Patient presents with    Sleep Problem    Fall    Weight Loss

## 2017-06-16 NOTE — PATIENT INSTRUCTIONS

## 2017-06-16 NOTE — PROGRESS NOTES
HISTORY OF PRESENT ILLNESS  Maya So is a 76 y.o. female. HPI  Continues with weight loss. Has slowly trended down from 109 to 103 in the last year. Pt feels her baseline weight is 112. Does not feel hungry. Trying to increase diet with healthy foods (2 eggs instead of 1, increased bread on sandwich). Does not snack between meals. Has not lost muscle mass, does not feel weaker. Not sleeping. Last night went to bed at 9:15, fell asleep at 11:30,  Awoke at 4:30. When she cant sleep she stays in bed. Just ordered low dose melatonin to try and help. Dr. Faith Zaragoza ordered thoracic xray showing multiple compression fractures. Constipation - takes smooth-lax. Helps for a couple of days but then stops helping. Drinks plenty of water. Good fruit and fiber in diet. Finds left ear with congestion. Dry mouth. Current Outpatient Prescriptions:     metoprolol succinate (TOPROL-XL) 50 mg XL tablet, TAKE 1 TABLET BY MOUTH DAILY. , Disp: 30 Tab, Rfl: 0    atorvastatin (LIPITOR) 40 mg tablet, TAKE 1 TABLET BY MOUTH NIGHTLY., Disp: 30 Tab, Rfl: 11    OTHER, probiotic once a day, Disp: , Rfl:     MAGNESIUM GLUCONATE, 400 mg by Does Not Apply route daily. , Disp: , Rfl:     apixaban (ELIQUIS) 5 mg tablet, Take 1 Tab by mouth two (2) times a day., Disp: 60 Tab, Rfl: 6    OTHER,NON-FORMULARY,, Algenol daily, Disp: , Rfl:     NATURE-THROID 81.25 mg tab, Take 1 Tab by mouth nightly., Disp: , Rfl:     b complex vitamins tablet, Take 1 Tab by mouth daily. , Disp: , Rfl:     cholecalciferol (VITAMIN D3) 5,000 unit capsule, Take 5,000 Units by mouth every Monday, Wednesday, Friday., Disp: , Rfl:     Visit Vitals    /90 (BP 1 Location: Right arm, BP Patient Position: Sitting)    Pulse 71    Temp 98.1 °F (36.7 °C) (Oral)    Resp 16    Ht 5' 5\" (1.651 m)    Wt 103 lb 9.6 oz (47 kg)    SpO2 98%    BMI 17.24 kg/m2       ROS  See above  Physical Exam   Constitutional: She appears well-developed and well-nourished. HENT:   Head: Normocephalic and atraumatic. Neck: Neck supple. No thyromegaly present. Cardiovascular: Normal rate, regular rhythm and normal heart sounds. Exam reveals no gallop and no friction rub. No murmur heard. Pulmonary/Chest: Effort normal and breath sounds normal.   Abdominal: Soft. Bowel sounds are normal. She exhibits no distension and no mass. There is no tenderness. Musculoskeletal: She exhibits no edema. Lymphadenopathy:     She has no cervical adenopathy. Vitals reviewed. ASSESSMENT and PLAN  Weight loss - looking back, on ly has lost approx 5 lbs in the last year. Discussed increasing protien, healthy snacks. Hyperlipidemia- controlled with medication, repeat labs  Hypothyroid - ? If meds need to be adjusted with weight loss and constipation. Check labs  htn - controlled  H/o CVA - no new symptoms. Continue same meds  Insomnia - will try melatonin. Discussed better sleep etiquette  Orders Placed This Encounter    METABOLIC PANEL, COMPREHENSIVE    LIPID PANEL    TSH 3RD GENERATION    T4, FREE     Follow-up Disposition: Not on File         This is a Subsequent Medicare Annual Wellness Visit providing Personalized Prevention Plan Services (PPPS) (Performed 12 months after initial AWV and PPPS )    I have reviewed the patient's medical history in detail and updated the computerized patient record.      History     Past Medical History:   Diagnosis Date    [facility] Leiden mutation [facility]     Hypertension     Hypothyroid     Migraine     Stroke ([facility] 6/07    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    Uterine cancer ([facility] [facility] 1994      Past Surgical History:   Procedure Laterality Date    BREAST SURGERY PROCEDURE UNLISTED  4/02, 3/01    L lumpectomy - atypical cells    HX CATARACT REMOVAL  10/09    right    HX HENNY AND BSO      for uterine cancer     Current Outpatient Prescriptions   Medication Sig Dispense Refill    metoprolol succinate (TOPROL-XL) 50 mg XL tablet TAKE 1 TABLET BY MOUTH DAILY. 30 Tab 0    atorvastatin (LIPITOR) 40 mg tablet TAKE 1 TABLET BY MOUTH NIGHTLY. 30 Tab 11    OTHER probiotic once a day      MAGNESIUM GLUCONATE 400 mg by Does Not Apply route daily.  apixaban (ELIQUIS) 5 mg tablet Take 1 Tab by mouth two (2) times a day. 60 Tab 6    OTHER,NON-FORMULARY, Algenol daily      NATURE-THROID 81.25 mg tab Take 1 Tab by mouth nightly.  b complex vitamins tablet Take 1 Tab by mouth daily.  cholecalciferol (VITAMIN D3) 5,000 unit capsule Take 5,000 Units by mouth every Monday, Wednesday, Friday. Allergies   Allergen Reactions    Ampicillin Hives    Celebrex [Celecoxib] Hives    Diltiazem Unknown (comments)     Family History   Problem Relation Age of Onset    Dementia Mother 62    Cancer Father      colon    Diabetes Father      type I    Stroke Sister      Social History   Substance Use Topics    Smoking status: Former Smoker     Packs/day: 25.00     Types: Cigarettes     Quit date: 1/1/1985    Smokeless tobacco: Never Used    Alcohol use 4.8 oz/week     8 Standard drinks or equivalent per week     Patient Active Problem List   Diagnosis Code    HTN (hypertension) I10    Cerebral infarction ([address] I63.9    Hypothyroid E03.9    Chronic constipation K59.09    Spinal stenosis in cervical region M48.02    IPMN (intraductal papillary mucinous neoplasm) D49.0    Stroke ([facility] I63.9    Status post placement of implantable loop recorder Z95.818       Depression Risk Factor Screening:     PHQ over the last two weeks 6/16/2017   Little interest or pleasure in doing things Not at all   Feeling down, depressed or hopeless Not at all   Total Score PHQ 2 0     Alcohol Risk Factor Screening: On any occasion during the past 3 months, have you had more than 3 drinks containing alcohol? No    Do you average more than 7 drinks per week?   No      Functional Ability and Level of Safety:     Hearing Loss none    Activities of Daily Living   Self-care. Requires assistance with: no ADLs    Fall Risk     Fall Risk Assessment, last 12 mths 6/16/2017   Able to walk? Yes   Fall in past 12 months? Yes   Fall with injury? No   Number of falls in past 12 months 3   Fall Risk Score 3     Abuse Screen   Patient is not abused    Review of Systems   Pertinent items are noted in HPI. Physical Examination     Evaluation of Cognitive Function:  Mood/affect:  happy  Appearance: age appropriate  Family member/caregiver input: none    See above    Patient Care Team:  Chay Cordoba MD as PCP - General (Internal Medicine)  Lucio Gonzalez MD (Cardiology)  Dennis Guillermo DO (Neurology)  Leigha Yoo RN as Ambulatory Care Navigator (Internal Medicine)  Beth Delatorre MD (Cardiology)    Advice/Referrals/Counseling   Education and counseling provided:  Has an advanced directive at home. Assessment/Plan    - utd  Orders Placed This Encounter    METABOLIC PANEL, COMPREHENSIVE    LIPID PANEL    TSH 3RD GENERATION    T4, FREE     Follow-up Disposition: Not on File.

## 2017-06-18 PROBLEM — E78.00 PURE HYPERCHOLESTEROLEMIA: Status: ACTIVE | Noted: 2017-06-18

## 2017-06-27 RX ORDER — APIXABAN 5 MG/1
TABLET, FILM COATED ORAL
Qty: 60 TAB | Refills: 6 | Status: SHIPPED | OUTPATIENT
Start: 2017-06-27 | End: 2018-02-05 | Stop reason: SDUPTHER

## 2017-06-27 NOTE — TELEPHONE ENCOUNTER
Request for eliquis 5mg twice a day. Last office visit 1/13/17, next office visit no yet made. Refills per verbal order from Dr. Gypsy Ceja.

## 2017-07-07 RX ORDER — METOPROLOL SUCCINATE 50 MG/1
TABLET, EXTENDED RELEASE ORAL
Qty: 30 TAB | Refills: 6 | Status: SHIPPED | OUTPATIENT
Start: 2017-07-07 | End: 2018-01-03 | Stop reason: SDUPTHER

## 2017-07-07 NOTE — TELEPHONE ENCOUNTER
Request for metoprolol 50mg every day. Last office visit 1/13/17, next office visit 7/24/17. Refills per verbal order from Dr. Charmayne Angst.

## 2017-07-24 ENCOUNTER — CLINICAL SUPPORT (OUTPATIENT)
Dept: CARDIOLOGY CLINIC | Age: 77
End: 2017-07-24

## 2017-07-24 ENCOUNTER — OFFICE VISIT (OUTPATIENT)
Dept: CARDIOLOGY CLINIC | Age: 77
End: 2017-07-24

## 2017-07-24 VITALS
WEIGHT: 106 LBS | HEIGHT: 65 IN | BODY MASS INDEX: 17.66 KG/M2 | SYSTOLIC BLOOD PRESSURE: 140 MMHG | DIASTOLIC BLOOD PRESSURE: 74 MMHG | RESPIRATION RATE: 16 BRPM

## 2017-07-24 DIAGNOSIS — I10 ESSENTIAL HYPERTENSION: ICD-10-CM

## 2017-07-24 DIAGNOSIS — Z87.891 HISTORY OF TOBACCO USE: ICD-10-CM

## 2017-07-24 DIAGNOSIS — I10 BENIGN ESSENTIAL HTN: ICD-10-CM

## 2017-07-24 DIAGNOSIS — I35.1 MODERATE AORTIC REGURGITATION: Primary | ICD-10-CM

## 2017-07-24 DIAGNOSIS — I48.0 PAF (PAROXYSMAL ATRIAL FIBRILLATION) (HCC): Primary | ICD-10-CM

## 2017-07-24 DIAGNOSIS — E78.00 PURE HYPERCHOLESTEROLEMIA: ICD-10-CM

## 2017-07-24 DIAGNOSIS — Z86.73 HISTORY OF STROKE: ICD-10-CM

## 2017-07-24 DIAGNOSIS — Z95.818 STATUS POST PLACEMENT OF IMPLANTABLE LOOP RECORDER: ICD-10-CM

## 2017-07-24 NOTE — PROGRESS NOTES
[facility] [place]: Randi Goss  [phone]    History of Present Illness:   Ms. Lashaun Phipps is a 75 yo F with remote history of tobacco, CVA in 11/2015 with at the time right sided deficits, hypertension, seen for moderate to severe aortic regurgitation on echo. 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 It was thought at that time that she may have had a stress induced cardiomyopathy, as she had some hypokinesis of the apex. On her echocardiogram in 12/2015, she did not have any wall motion abnormality and had preserved LV function. Since her last visit, she continues to do well. No chest pain, shortness of breath, palpitations or syncope. She did have an episode of dizziness last week where she felt things spinning that lasted a few hours. There were no particular triggers. She has had vertigo in the past, but noted this was a little bit different. Repeat echocardiogram today demonstrated normal LV function and her aortic regurgitation had decreased from moderate to mild to moderate. She is compensated on exam with clear lungs and no lower extremity edema, I/VI systolic murmur at the left upper sternal border. Her aortic root was also minimally dilated at 4 cm and we discussed this. Soc hx. Tob quit 27 yo.  [facility]. Assessment and Plan:   1. Aortic regurgitation. Mild to moderate in severity and she continues asymptomatic. Will likely repeat echocardiogram in two years. Follow up in one year. 2. Essential hypertension. Blood pressure is controlled and no changes made. 3. Remote tobacco use. 4. History of CVA in 11/2015. She  has a past medical history of Factor V Leiden mutation ([facility] [address] Hypertension; Hypothyroid; Migraine; Stroke ([facility] [address] (6/07); and Uterine cancer ([facility] [address] (1994). All other systems negative except as above.      PE  Vitals:    07/24/17 1036   BP: 140/74   Resp: 16   Weight: 106 lb (48.1 kg)   Height: 5' 5\" (1.651 m)    Body mass index is 17.64 kg/(m^2).    General appearance - alert, well appearing, and in no distress  Mental status - affect appropriate to mood  Eyes - sclera anicteric, moist mucous membranes  Neck - supple, no JVD  Chest - clear to auscultation, no wheezes, rales or rhonchi  Heart - normal rate, regular rhythm, normal S1, S2, I-II/VI systolic murmur LUSB  Abdomen - soft, nontender, nondistended, no masses or organomegaly  Extremities - peripheral pulses normal, no pedal edema      Recent Labs:  Lab Results   Component Value Date/Time    Cholesterol, total 172 08/28/2016 09:23 AM    HDL Cholesterol 60 08/28/2016 09:23 AM    LDL cholesterol See Note 01/14/2010 08:37 AM    LDL, calculated 92.6 08/28/2016 09:23 AM    Triglyceride 97 08/28/2016 09:23 AM    CHOL/HDL Ratio 2.9 08/28/2016 09:23 AM     Lab Results   Component Value Date/Time    Creatinine (POC) 0.5 08/28/2016 08:47 AM    Creatinine 0.64 10/31/2016 12:22 PM     Lab Results   Component Value Date/Time    BUN 18 10/31/2016 12:22 PM    BUN (POC) 13 08/28/2016 08:47 AM     Lab Results   Component Value Date/Time    Potassium 4.8 10/31/2016 12:22 PM     Lab Results   Component Value Date/Time    Hemoglobin A1c 5.2 08/28/2016 09:28 AM     Lab Results   Component Value Date/Time    Hemoglobin (POC) 13.3 08/28/2016 08:47 AM    HGB 13.3 10/31/2016 12:22 PM     Lab Results   Component Value Date/Time    PLATELET 916 57/39/8591 12:22 PM       Reviewed:  Past Medical History:   Diagnosis Date    [facility]     Hypertension     Hypothyroid     Migraine     Stroke ([facility] 6/07    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    Uterine cancer ([facility] 1994     History   Smoking Status    Former Smoker    Packs/day: 25.00    Types: Cigarettes    Quit date: 1/1/1985   Smokeless Tobacco    Never Used     History   Alcohol Use    4.8 oz/week    8 Standard drinks or equivalent per week     Allergies Allergen Reactions    Ampicillin Hives    Celebrex [Celecoxib] Hives    Diltiazem Unknown (comments)       Current Outpatient Prescriptions   Medication Sig    metoprolol succinate (TOPROL-XL) 50 mg XL tablet TAKE 1 TABLET BY MOUTH DAILY.  ELIQUIS 5 mg tablet TAKE 1 TABLET BY MOUTH 2 TIMES A DAY.  atorvastatin (LIPITOR) 40 mg tablet TAKE 1 TABLET BY MOUTH NIGHTLY.  OTHER probiotic once a day    MAGNESIUM GLUCONATE 400 mg by Does Not Apply route daily.  OTHER,NON-FORMULARY, Algenol daily    NATURE-THROID 81.25 mg tab Take 1 Tab by mouth nightly.  b complex vitamins tablet Take 1 Tab by mouth daily.  cholecalciferol (VITAMIN D3) 5,000 unit capsule Take 5,000 Units by mouth every Monday, Wednesday, Friday. No current facility-administered medications for this visit.         MD Shelli Zarate  [facility] [place]  [address] [facility] [address]  [address]

## 2017-07-24 NOTE — MR AVS SNAPSHOT
Visit Information Date & Time Provider Department Dept. Phone Encounter #  
 7/24/2017 10:40 AM Josselyn Bernard MD [facility] Demarco Encarnacion [phone] [id number] Upcoming Health Maintenance Date Due COLONOSCOPY 2/1/1958 GLAUCOMA SCREENING Q2Y 4/1/2017 INFLUENZA AGE 9 TO ADULT 8/1/2017 Pneumococcal 65+ Low/Medium Risk (2 of 2 - PPSV23) 12/30/2017 MEDICARE YEARLY EXAM 6/17/2018 DTaP/Tdap/Td series (2 - Td) 5/6/2025 Allergies as of 7/24/2017  Review Complete On: 7/24/2017 By: Kate Bey Severity Noted Reaction Type Reactions Ampicillin  09/14/2011    Hives Celebrex [Celecoxib]  09/14/2011    Hives Diltiazem  08/28/2016    Unknown (comments) Current Immunizations  Reviewed on 12/2/2013 Name Date Influenza Vaccine  Deferred (Patient Refused) PPD 4/30/2012 Pneumococcal Polysaccharide (PPSV-23)  Deferred (Patient Refused) Tdap 5/6/2015  1:37 PM  
  
 Not reviewed this visit Vitals BP Resp Height(growth percentile) Weight(growth percentile) BMI OB Status 140/74 (BP 1 Location: Left arm, BP Patient Position: Sitting) 16 5' 5\" (1.651 m) 106 lb (48.1 kg) 17.64 kg/m2 Hysterectomy Smoking Status Former Smoker Vitals History BMI and BSA Data Body Mass Index Body Surface Area  
 17.64 kg/m 2 1.49 m 2 Preferred Pharmacy Pharmacy Name Phone [address], [address] [phone] Your Updated Medication List  
  
   
This list is accurate as of: 7/24/17 11:37 AM.  Always use your most recent med list.  
  
  
  
  
 atorvastatin 40 mg tablet Commonly known as:  LIPITOR  
TAKE 1 TABLET BY MOUTH NIGHTLY. b complex vitamins tablet Take 1 Tab by mouth daily. cholecalciferol 5,000 unit capsule Commonly known as:  VITAMIN D3 Take 5,000 Units by mouth every Monday, Wednesday, Friday. ELIQUIS 5 mg tablet Generic drug:  apixaban TAKE 1 TABLET BY MOUTH 2 TIMES A DAY. MAGNESIUM GLUCONATE  
400 mg by Does Not Apply route daily. metoprolol succinate 50 mg XL tablet Commonly known as:  TOPROL-XL  
TAKE 1 TABLET BY MOUTH DAILY. NATURE-THROID 81.25 mg Tab Generic drug:  thyroid (pork) Take 1 Tab by mouth nightly. OTHER  
probiotic once a day OTHER(NON-FORMULARY) Algenol daily Introducing \[facility]\"" & HEALTH SERVICES! Dear Tian Oliver: Thank you for requesting a [employer] account. Our records indicate that you already have an active [employer] account. You can access your account anytime at [email or web address] Did you know that you can access your hospital and ER discharge instructions at any time in [employer]? You can also review all of your test results from your hospital stay or ER visit. Additional Information If you have questions, please visit the Frequently Asked Questions section of the [employer] website at [email or web address] Remember, [employer] is NOT to be used for urgent needs. For medical emergencies, dial 911. Now available from your iPhone and Android! Please provide this summary of care documentation to your next provider. Your primary care clinician is listed as JORY LAMA. If you have any questions after today's visit, please call [phone].

## 2017-07-25 PROBLEM — I10 BENIGN ESSENTIAL HTN: Status: ACTIVE | Noted: 2017-07-25

## 2017-07-25 PROBLEM — Z86.73 HISTORY OF STROKE: Status: ACTIVE | Noted: 2017-07-25

## 2017-07-25 PROBLEM — I35.1 MODERATE AORTIC REGURGITATION: Status: ACTIVE | Noted: 2017-07-25

## 2017-07-25 PROBLEM — Z87.891 HISTORY OF TOBACCO USE: Status: ACTIVE | Noted: 2017-07-25

## 2017-08-09 ENCOUNTER — OFFICE VISIT (OUTPATIENT)
Dept: CARDIOLOGY CLINIC | Age: 77
End: 2017-08-09

## 2017-08-09 DIAGNOSIS — Z95.818 STATUS POST PLACEMENT OF IMPLANTABLE LOOP RECORDER: Primary | ICD-10-CM

## 2017-08-21 NOTE — TELEPHONE ENCOUNTER
patient needs 90 day supply.   Last seen: 06/16/17    Requested Prescriptions     Pending Prescriptions Disp Refills    atorvastatin (LIPITOR) 40 mg tablet 90 Tab 3

## 2017-08-22 RX ORDER — ATORVASTATIN CALCIUM 40 MG/1
40 TABLET, FILM COATED ORAL
Qty: 90 TAB | Refills: 3 | Status: SHIPPED | OUTPATIENT
Start: 2017-08-22

## 2017-10-17 ENCOUNTER — TELEPHONE (OUTPATIENT)
Dept: CARDIOLOGY CLINIC | Age: 77
End: 2017-10-17

## 2017-10-17 NOTE — TELEPHONE ENCOUNTER
Mr So \" cannot talk right now\", wife would not be home until after 5. Requested a manual transmission for her [employer] via [id number]. Missing data to be reviewed once received.

## 2017-12-20 ENCOUNTER — OFFICE VISIT (OUTPATIENT)
Dept: CARDIOLOGY CLINIC | Age: 77
End: 2017-12-20

## 2017-12-20 ENCOUNTER — DOCUMENTATION ONLY (OUTPATIENT)
Dept: CARDIOLOGY CLINIC | Age: 77
End: 2017-12-20

## 2017-12-20 ENCOUNTER — TELEPHONE (OUTPATIENT)
Dept: CARDIOLOGY CLINIC | Age: 77
End: 2017-12-20

## 2017-12-20 DIAGNOSIS — Z95.818 STATUS POST PLACEMENT OF IMPLANTABLE LOOP RECORDER: Primary | ICD-10-CM

## 2017-12-20 NOTE — PROGRESS NOTES
Atrial fib with RVR. The mean V rate is 146 to 188 bpm, max reaching 188 to 231 bpm. She is asymptomatic    She is on metoprolol  Need BP check and increase to 100 mg every day    Current Outpatient Prescriptions on File Prior to Visit   Medication Sig Dispense Refill    atorvastatin (LIPITOR) 40 mg tablet Take 1 Tab by mouth nightly. 90 Tab 3    metoprolol succinate (TOPROL-XL) 50 mg XL tablet TAKE 1 TABLET BY MOUTH DAILY. 30 Tab 6    ELIQUIS 5 mg tablet TAKE 1 TABLET BY MOUTH 2 TIMES A DAY. 60 Tab 6    OTHER probiotic once a day      MAGNESIUM GLUCONATE 400 mg by Does Not Apply route daily.  OTHER,NON-FORMULARY, Algenol daily      NATURE-THROID 81.25 mg tab Take 1 Tab by mouth nightly.  b complex vitamins tablet Take 1 Tab by mouth daily.  cholecalciferol (VITAMIN D3) 5,000 unit capsule Take 5,000 Units by mouth every Monday, Wednesday, Friday. No current facility-administered medications on file prior to visit.           Future Appointments  Date Time Provider Eliceo Rodas   12/20/2017 11:45 AM Stew Been REGINO SCHED   1/22/2018 3:45 PM Stew Been REGINO SCHED   7/23/2018 10:40 AM Olya Calvillo  [address]

## 2017-12-20 NOTE — TELEPHONE ENCOUNTER
Atrial fib with RVR. The mean V rate is 146 to 188 bpm, max reaching 188 to 231 bpm. She is asymptomatic     She is on metoprolol  Need BP check and increase to 100 mg every day       Called patient. Spoke with . Verified identity.  states he was recently contacted by someone in our office regarding the changes. States he does not need a new script at this time, they will double up on the 50 mg dose. Also instructed patient to check BP daily and call the office with any problems.

## 2017-12-21 ENCOUNTER — DOCUMENTATION ONLY (OUTPATIENT)
Dept: CARDIOLOGY CLINIC | Age: 77
End: 2017-12-21

## 2017-12-21 ENCOUNTER — TELEPHONE (OUTPATIENT)
Dept: CARDIOLOGY CLINIC | Age: 77
End: 2017-12-21

## 2017-12-21 NOTE — TELEPHONE ENCOUNTER
Patient states she doubled the dosage on her medications but she got very dizzy this morning and wants to know what to do   Phone: [phone]

## 2017-12-21 NOTE — PROGRESS NOTES
ILR showed AFIB RVR episodes (21)    Need appt sooner with Dr Lema Just  Date Time Provider Eliceo Rodas   1/22/2018 3:45 PM REMOTE1, [id number] [address] [facility]   7/23/2018 10:40 AM Maxim Corona  [address]         Current Outpatient Prescriptions on File Prior to Visit   Medication Sig Dispense Refill    atorvastatin (LIPITOR) 40 mg tablet Take 1 Tab by mouth nightly. 90 Tab 3    metoprolol succinate (TOPROL-XL) 50 mg XL tablet TAKE 1 TABLET BY MOUTH DAILY. 30 Tab 6    ELIQUIS 5 mg tablet TAKE 1 TABLET BY MOUTH 2 TIMES A DAY. 60 Tab 6    OTHER probiotic once a day      MAGNESIUM GLUCONATE 400 mg by Does Not Apply route daily.  OTHER,NON-FORMULARY, Algenol daily      NATURE-THROID 81.25 mg tab Take 1 Tab by mouth nightly.  b complex vitamins tablet Take 1 Tab by mouth daily.  cholecalciferol (VITAMIN D3) 5,000 unit capsule Take 5,000 Units by mouth every Monday, Wednesday, Friday. No current facility-administered medications on file prior to visit.

## 2017-12-21 NOTE — TELEPHONE ENCOUNTER
Returned call to patient. Verified identity. Advised patient to check BP and HR before taking the additional dose and hold it and just take her regular scheduled dose if below normal parameters. Advised patient to call if she is feeling dizzy or symptomatic.

## 2017-12-27 ENCOUNTER — TELEPHONE (OUTPATIENT)
Dept: CARDIOLOGY CLINIC | Age: 77
End: 2017-12-27

## 2017-12-27 NOTE — TELEPHONE ENCOUNTER
Called patient. Left a message on her cell phone to call the office to reschedule appointment. Called home number listed, spoke with patients . States his wife was not home.  Asked him to have her call at her earliest convenience to schedule a sooner appointment with Dr. Flori Ewing.

## 2017-12-27 NOTE — TELEPHONE ENCOUNTER
Patient returned my call. Verified identity.  Rescheduled patient to see Dr. Jill Hall 1/3/18 per Dr. Marialuisa Miller request.

## 2017-12-28 ENCOUNTER — TELEPHONE (OUTPATIENT)
Dept: CARDIOLOGY CLINIC | Age: 77
End: 2017-12-28

## 2017-12-28 NOTE — TELEPHONE ENCOUNTER
Received a call from patients pharmacy. States patient is requesting Eliquis too early. She informed the pharmacist that her Eliquis has been increased and she needs a refill. Called to speak with patient, she was not home. Spoke with  who states she has doubled up on Eliquis for about the last week. I informed him to have her stop taking a double dose of Eliquis. Her metoprolol was increased on 12/20/17 and they confused the medication. Advised him to have his wife call the office upon her return. Dr. Sabra Ahumada is aware.  Her next appointment is 1/3/18 with Dr. Sabra Ahumada.

## 2017-12-28 NOTE — TELEPHONE ENCOUNTER
Patient returned my call. Verified identity. Patient states she was taking Eliquis 10 mg BID. Informed patient to please stop taking a double dose and resume 5 mg BID. Reminded her that her Metoprolol has been increased to 100 mg not the Eliquis. Patient acknowledged understanding.

## 2018-01-03 ENCOUNTER — OFFICE VISIT (OUTPATIENT)
Dept: CARDIOLOGY CLINIC | Age: 78
End: 2018-01-03

## 2018-01-03 VITALS
WEIGHT: 107 LBS | RESPIRATION RATE: 16 BRPM | DIASTOLIC BLOOD PRESSURE: 80 MMHG | HEIGHT: 65 IN | SYSTOLIC BLOOD PRESSURE: 120 MMHG | BODY MASS INDEX: 17.83 KG/M2 | HEART RATE: 64 BPM

## 2018-01-03 DIAGNOSIS — I10 BENIGN ESSENTIAL HTN: Primary | ICD-10-CM

## 2018-01-03 RX ORDER — METOPROLOL SUCCINATE 25 MG/1
TABLET, EXTENDED RELEASE ORAL
Qty: 30 TAB | Refills: 11 | Status: SHIPPED | OUTPATIENT
Start: 2018-01-03 | End: 2019-01-11 | Stop reason: SDUPTHER

## 2018-01-03 RX ORDER — METOPROLOL SUCCINATE 25 MG/1
TABLET, EXTENDED RELEASE ORAL
COMMUNITY
End: 2018-01-03 | Stop reason: SDUPTHER

## 2018-01-03 RX ORDER — THYROID, PORCINE 90 MG/1
1 TABLET ORAL DAILY
Refills: 0 | COMMUNITY
Start: 2017-11-30 | End: 2018-04-17

## 2018-01-03 RX ORDER — METOPROLOL SUCCINATE 50 MG/1
TABLET, EXTENDED RELEASE ORAL
Qty: 30 TAB | Refills: 11 | Status: SHIPPED | OUTPATIENT
Start: 2018-01-03 | End: 2019-01-11 | Stop reason: SDUPTHER

## 2018-01-03 NOTE — MR AVS SNAPSHOT
Visit Information Date & Time Provider Department Dept. Phone Encounter #  
 1/3/2018  2:20 PM Marielena Toussaint MD [facility] [phone] [id number] Your Appointments 7/23/2018 10:40 AM  
ESTABLISHED PATIENT with Marielena Toussaint MD  
[facility] ([address]) Appt Note: one year follow up  
 [address] [facility] [address]  
[address] [facility] 7 [address] Upcoming Health Maintenance Date Due COLONOSCOPY 2/1/1958 GLAUCOMA SCREENING Q2Y 4/1/2017 Influenza Age 5 to Adult 8/1/2017 Pneumococcal 65+ Low/Medium Risk (2 of 2 - PPSV23) 12/30/2017 MEDICARE YEARLY EXAM 6/17/2018 DTaP/Tdap/Td series (2 - Td) 5/6/2025 Allergies as of 1/3/2018  Review Complete On: 1/3/2018 By: Shandra Elliott Severity Noted Reaction Type Reactions Ampicillin  09/14/2011    Hives Celebrex [Celecoxib]  09/14/2011    Hives Diltiazem  08/28/2016    Unknown (comments) Current Immunizations  Reviewed on 12/2/2013 Name Date Influenza Vaccine  Deferred (Patient Refused) PPD 4/30/2012 Pneumococcal Polysaccharide (PPSV-23)  Deferred (Patient Refused) Tdap 5/6/2015  1:37 PM  
  
 Not reviewed this visit Vitals BP Pulse Resp Height(growth percentile) Weight(growth percentile) BMI  
 120/80 (BP 1 Location: Left arm, BP Patient Position: Sitting) 64 16 5' 5\" (1.651 m) 107 lb (48.5 kg) 17.81 kg/m2 OB Status Smoking Status Hysterectomy Former Smoker Vitals History BMI and BSA Data Body Mass Index Body Surface Area  
 17.81 kg/m 2 1.49 m 2 Preferred Pharmacy Pharmacy Name Phone [address], [address] [phone] Your Updated Medication List  
  
   
This list is accurate as of: 1/3/18  2:49 PM.  Always use your most recent med list.  
  
  
  
  
 atorvastatin 40 mg tablet Commonly known as:  LIPITOR Take 1 Tab by mouth nightly. b complex vitamins tablet Take 1 Tab by mouth daily. cholecalciferol 5,000 unit capsule Commonly known as:  VITAMIN D3 Take 5,000 Units by mouth every Monday, Wednesday, Friday. ELIQUIS 5 mg tablet Generic drug:  apixaban TAKE 1 TABLET BY MOUTH 2 TIMES A DAY. MAGNESIUM GLUCONATE  
400 mg by Does Not Apply route daily. * TOPROL XL 25 mg XL tablet Generic drug:  metoprolol succinate Take  by mouth. take along with 50 mg tab for a total of 75 mg daily * metoprolol succinate 50 mg XL tablet Commonly known as:  TOPROL-XL  
TAKE 1 TABLET BY MOUTH DAILY. * NATURE-THROID 81.25 mg Tab Generic drug:  thyroid (pork) Take 1 Tab by mouth nightly. * ARMOUR THYROID 90 mg tablet Generic drug:  thyroid (Pork) Take 1 Tab by mouth daily. OTHER  
probiotic once a day OTHER(NON-FORMULARY) Algenol daily * Notice: This list has 4 medication(s) that are the same as other medications prescribed for you. Read the directions carefully, and ask your doctor or other care provider to review them with you. Introducing [facility]! Dear Snehal Oliveira: Thank you for requesting a [employer] account. Our records indicate that you already have an active [employer] account. You can access your account anytime at [email or web address] Did you know that you can access your hospital and ER discharge instructions at any time in [employer]? You can also review all of your test results from your hospital stay or ER visit. Additional Information If you have questions, please visit the Frequently Asked Questions section of the [employer] website at [email or web address] Remember, [employer] is NOT to be used for urgent needs. For medical emergencies, dial 911. Now available from your iPhone and Android! Please provide this summary of care documentation to your next provider. Your primary care clinician is listed as JORY LAMA. If you have any questions after today's visit, please call [phone].

## 2018-01-03 NOTE — PROGRESS NOTES
[facility] [place]: [facility]  [phone]    History of Present Illness:   Ms. Beonit Dove is a 77 yo F with remote history of tobacco, CVA in 11/2015 with at the time right sided deficits, hypertension, seen for moderate to severe aortic regurgitation on echo. 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 It was thought at that time that she may have had a stress induced cardiomyopathy, as she had some hypokinesis of the apex. On her echocardiogram in 12/2015, she did not have any wall motion abnormality and had preserved LV function. On her loop, she had multiple runs of atrial fibrillation with rapid heart rate and subsequently had her increase her Toprol from 50 to 100 mg. For some period of time, she was taking double the Eliquis instead, but this was changed after a week. She denies any bleeding issues. Overall, she feels well with no chest pain or shortness of breath or palpitations. She has felt more tired and has not slept well for the last week, but she is not sure why. She said she has been eating and drinking okay. She did say her thyroid hormone was recently changed. She is compensated on exam with clear lungs and no lower extremity edema. Blood pressure was 120/80 with a heart rate of 64 bpm today. She has been taking the Toprol at 100 mg. Soc hx. Tob quit 25 yo.  [place] [facility]. Assessment and Plan:   1. Paroxysmal atrial fibrillation. Stable in sinus rhythm on higher dose of Metoprolol for rate control. I am not sure if this is related to her sleeping disturbance. Timing wise it seems it may be related and will decrease her Toprol from 100 mg once daily to 75 mg. Will have her follow back in one month and reassess. 2. Essential hypertension. Blood pressure is controlled. 3. Aortic regurgitation, mild to moderate in severity by echocardiogram.  Consider repeat in two years. 4. Remote tobacco use. 5. History of CVA in 11/2015. 6. Oral anticoagulation. No bleeding issues on Eliquis. She  has a past medical history of Factor V Leiden mutation ([facility] Hypertension; Hypothyroid; Migraine; Stroke ([facility] (6/07); and Uterine cancer ([facility] (1994). All other systems negative except as above. PE  Vitals:    01/03/18 1426   BP: 120/80   Pulse: 64   Resp: 16   Weight: 107 lb (48.5 kg)   Height: 5' 5\" (1.651 m)    Body mass index is 17.81 kg/(m^2).    General appearance - alert, well appearing, and in no distress  Mental status - affect appropriate to mood  Eyes - sclera anicteric, moist mucous membranes  Neck - supple, no JVD  Chest - clear to auscultation, no wheezes, rales or rhonchi  Heart - normal rate, regular rhythm, normal S1, S2, I-II/VI systolic murmur LUSB  Abdomen - soft, nontender, nondistended, no masses or organomegaly  Extremities - peripheral pulses normal, no pedal edema      Recent Labs:  Lab Results   Component Value Date/Time    Cholesterol, total 172 08/28/2016 09:23 AM    HDL Cholesterol 60 08/28/2016 09:23 AM    LDL cholesterol See Note 01/14/2010 08:37 AM    LDL, calculated 92.6 08/28/2016 09:23 AM    Triglyceride 97 08/28/2016 09:23 AM    CHOL/HDL Ratio 2.9 08/28/2016 09:23 AM     Lab Results   Component Value Date/Time    Creatinine (POC) 0.5 08/28/2016 08:47 AM    Creatinine 0.64 10/31/2016 12:22 PM     Lab Results   Component Value Date/Time    BUN 18 10/31/2016 12:22 PM    BUN (POC) 13 08/28/2016 08:47 AM     Lab Results   Component Value Date/Time    Potassium 4.8 10/31/2016 12:22 PM     Lab Results   Component Value Date/Time    Hemoglobin A1c 5.2 08/28/2016 09:28 AM     Lab Results   Component Value Date/Time    Hemoglobin (POC) 13.3 08/28/2016 08:47 AM    HGB 13.3 10/31/2016 12:22 PM     Lab Results   Component Value Date/Time    PLATELET 410 83/54/9127 12:22 PM       Reviewed:  Past Medical History:   Diagnosis Date    Factor V Leiden mutation ([facility]     Hypertension     Hypothyroid     Migraine     Stroke ([facility] 6/07    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    Uterine cancer ([facility] 1994     History   Smoking Status    Former Smoker    Packs/day: 25.00    Types: Cigarettes    Quit date: 1/1/1985   Smokeless Tobacco    Never Used     History   Alcohol Use    4.8 oz/week    8 Standard drinks or equivalent per week     Allergies   Allergen Reactions    Ampicillin Hives    Celebrex [Celecoxib] Hives    Diltiazem Unknown (comments)       Current Outpatient Prescriptions   Medication Sig    ARMOUR THYROID 90 mg tablet Take 1 Tab by mouth daily.  atorvastatin (LIPITOR) 40 mg tablet Take 1 Tab by mouth nightly.  metoprolol succinate (TOPROL-XL) 50 mg XL tablet TAKE 1 TABLET BY MOUTH DAILY. (Patient taking differently: TAKE 12 TABLETS BY MOUTH DAILY. )    ELIQUIS 5 mg tablet TAKE 1 TABLET BY MOUTH 2 TIMES A DAY.  MAGNESIUM GLUCONATE 400 mg by Does Not Apply route daily.  OTHER,NON-FORMULARY, Algenol daily    b complex vitamins tablet Take 1 Tab by mouth daily.  cholecalciferol (VITAMIN D3) 5,000 unit capsule Take 5,000 Units by mouth every Monday, Wednesday, Friday.  OTHER probiotic once a day    NATURE-THROID 81.25 mg tab Take 1 Tab by mouth nightly. No current facility-administered medications for this visit.         Matt Nuñez MD  [facility] [place]  [address] [facility]  [address]

## 2018-01-22 ENCOUNTER — CLINICAL SUPPORT (OUTPATIENT)
Dept: CARDIOLOGY CLINIC | Age: 78
End: 2018-01-22

## 2018-01-22 DIAGNOSIS — Z95.818 STATUS POST PLACEMENT OF IMPLANTABLE LOOP RECORDER: Primary | ICD-10-CM

## 2018-02-06 RX ORDER — APIXABAN 5 MG/1
TABLET, FILM COATED ORAL
Qty: 60 TAB | Refills: 0 | Status: SHIPPED | OUTPATIENT
Start: 2018-02-06 | End: 2018-03-12 | Stop reason: SDUPTHER

## 2018-02-26 ENCOUNTER — CLINICAL SUPPORT (OUTPATIENT)
Dept: CARDIOLOGY CLINIC | Age: 78
End: 2018-02-26

## 2018-02-26 DIAGNOSIS — Z95.818 STATUS POST PLACEMENT OF IMPLANTABLE LOOP RECORDER: Primary | ICD-10-CM

## 2018-03-12 RX ORDER — APIXABAN 5 MG/1
TABLET, FILM COATED ORAL
Qty: 60 TAB | Refills: 0 | Status: SHIPPED | OUTPATIENT
Start: 2018-03-12 | End: 2018-03-13 | Stop reason: SDUPTHER

## 2018-03-12 NOTE — TELEPHONE ENCOUNTER
Request for Eliquis 5 mg BID. Last office visit 1/3/18, next office visit 7/23/18.  Refills per verbal order from Dr. Kvng Kaba.

## 2018-03-13 NOTE — TELEPHONE ENCOUNTER
Requested Prescriptions     Signed Prescriptions Disp Refills    apixaban (ELIQUIS) 5 mg tablet 60 Tab 11     Sig: Take 1 Tab by mouth two (2) times a day.      Authorizing Provider: Kristin Gonzalez     Ordering User: Itzel Yates     Per orders

## 2018-03-14 ENCOUNTER — TELEPHONE (OUTPATIENT)
Dept: CARDIOLOGY CLINIC | Age: 78
End: 2018-03-14

## 2018-03-14 NOTE — TELEPHONE ENCOUNTER
[facility] Dayan Portillo at fax number [phone]. Fax confirmation received. [facility].

## 2018-03-14 NOTE — TELEPHONE ENCOUNTER
Per Elda Tapia, RN Implantable loop recorder is MRI compatible. Will ask MD if further instructions are needed.

## 2018-03-14 NOTE — LETTER
3/14/2018 9:54 AM 
 
Ms. Maya So [address] [id number] Ms. So has an implantable loop recorder that is MRI compatible. No further instructions needed.  
 
 
 
Sincerely, 
 
 
Yi Cates MD

## 2018-03-14 NOTE — TELEPHONE ENCOUNTER
Patient is having an MRI per Dr. Sandra Kody and needs to know how to handle loop recorder. Teri Sparks from [employer] would like instructions faxed [phone]. If you need to speak with her she can be reached at [phone].  Thank you

## 2018-03-22 ENCOUNTER — HOSPITAL ENCOUNTER (OUTPATIENT)
Dept: MRI IMAGING | Age: 78
Discharge: HOME | End: 2018-03-22
Attending: FAMILY MEDICINE
Payer: MEDICARE

## 2018-03-22 DIAGNOSIS — R10.31 RIGHT GROIN PAIN: ICD-10-CM

## 2018-03-22 DIAGNOSIS — M25.551 RIGHT HIP PAIN: ICD-10-CM

## 2018-03-22 PROCEDURE — 73721 MRI JNT OF LWR EXTRE W/O DYE: CPT

## 2018-03-27 ENCOUNTER — OFFICE VISIT (OUTPATIENT)
Dept: CARDIOLOGY CLINIC | Age: 78
End: 2018-03-27

## 2018-03-27 DIAGNOSIS — Z95.818 STATUS POST PLACEMENT OF IMPLANTABLE LOOP RECORDER: Primary | ICD-10-CM

## 2018-04-13 ENCOUNTER — HOSPITAL ENCOUNTER (OUTPATIENT)
Dept: GENERAL RADIOLOGY | Age: 78
Discharge: HOME | End: 2018-04-13
Attending: ORTHOPAEDIC SURGERY
Payer: MEDICARE

## 2018-04-13 DIAGNOSIS — M16.11 PRIMARY OSTEOARTHRITIS OF RIGHT HIP: ICD-10-CM

## 2018-04-13 PROCEDURE — 20610 DRAIN/INJ JOINT/BURSA W/O US: CPT

## 2018-04-13 PROCEDURE — 74011636320 HC RX REV CODE- 636/320: Performed by: RADIOLOGY

## 2018-04-13 PROCEDURE — 74011000250 HC RX REV CODE- 250: Performed by: RADIOLOGY

## 2018-04-13 PROCEDURE — 74011250636 HC RX REV CODE- 250/636: Performed by: RADIOLOGY

## 2018-04-13 RX ORDER — TRIAMCINOLONE ACETONIDE 40 MG/ML
40 INJECTION, SUSPENSION INTRA-ARTICULAR; INTRAMUSCULAR
Status: COMPLETED | OUTPATIENT
Start: 2018-04-13 | End: 2018-04-13

## 2018-04-13 RX ORDER — BUPIVACAINE HYDROCHLORIDE 5 MG/ML
5 INJECTION, SOLUTION EPIDURAL; INTRACAUDAL
Status: COMPLETED | OUTPATIENT
Start: 2018-04-13 | End: 2018-04-13

## 2018-04-13 RX ORDER — LIDOCAINE HYDROCHLORIDE 10 MG/ML
10 INJECTION INFILTRATION; PERINEURAL
Status: COMPLETED | OUTPATIENT
Start: 2018-04-13 | End: 2018-04-13

## 2018-04-13 RX ADMIN — TRIAMCINOLONE ACETONIDE 40 MG: 40 INJECTION, SUSPENSION INTRA-ARTICULAR; INTRAMUSCULAR at 14:35

## 2018-04-13 RX ADMIN — BUPIVACAINE HYDROCHLORIDE 5 MG: 5 INJECTION, SOLUTION EPIDURAL; INTRACAUDAL at 14:34

## 2018-04-13 RX ADMIN — IOHEXOL 3 ML: 180 INJECTION INTRAVENOUS at 03:00

## 2018-04-13 RX ADMIN — LIDOCAINE HYDROCHLORIDE 5 ML: 10 INJECTION, SOLUTION INFILTRATION; PERINEURAL at 14:33

## 2018-04-15 ENCOUNTER — APPOINTMENT (OUTPATIENT)
Dept: GENERAL RADIOLOGY | Age: 78
End: 2018-04-15
Attending: EMERGENCY MEDICINE
Payer: MEDICARE

## 2018-04-15 ENCOUNTER — HOSPITAL ENCOUNTER (EMERGENCY)
Age: 78
Discharge: HOME | End: 2018-04-15
Attending: EMERGENCY MEDICINE
Payer: MEDICARE

## 2018-04-15 VITALS
HEART RATE: 64 BPM | HEIGHT: 64 IN | DIASTOLIC BLOOD PRESSURE: 105 MMHG | BODY MASS INDEX: 17.73 KG/M2 | SYSTOLIC BLOOD PRESSURE: 195 MMHG | OXYGEN SATURATION: 97 % | WEIGHT: 103.84 LBS | RESPIRATION RATE: 22 BRPM | TEMPERATURE: 98.2 F

## 2018-04-15 DIAGNOSIS — I48.0 PAROXYSMAL ATRIAL FIBRILLATION (HCC): ICD-10-CM

## 2018-04-15 DIAGNOSIS — R00.2 PALPITATIONS: Primary | ICD-10-CM

## 2018-04-15 LAB
ALBUMIN SERPL-MCNC: 4.4 G/DL (ref 3.5–5)
ALBUMIN/GLOB SERPL: 1.2 {RATIO} (ref 1.1–2.2)
ALP SERPL-CCNC: 75 U/L (ref 45–117)
ALT SERPL-CCNC: 57 U/L (ref 12–78)
ANION GAP SERPL CALC-SCNC: 8 MMOL/L (ref 5–15)
APPEARANCE UR: CLEAR
AST SERPL-CCNC: 36 U/L (ref 15–37)
BACTERIA URNS QL MICRO: NEGATIVE /HPF
BASOPHILS # BLD: 0 K/UL (ref 0–0.1)
BASOPHILS NFR BLD: 0 % (ref 0–1)
BILIRUB SERPL-MCNC: 0.6 MG/DL (ref 0.2–1)
BILIRUB UR QL: NEGATIVE
BUN SERPL-MCNC: 18 MG/DL (ref 6–20)
BUN/CREAT SERPL: 22 (ref 12–20)
CALCIUM SERPL-MCNC: 9.7 MG/DL (ref 8.5–10.1)
CHLORIDE SERPL-SCNC: 106 MMOL/L (ref 97–108)
CO2 SERPL-SCNC: 25 MMOL/L (ref 21–32)
COLOR UR: ABNORMAL
CREAT SERPL-MCNC: 0.81 MG/DL (ref 0.55–1.02)
DIFFERENTIAL METHOD BLD: ABNORMAL
EOSINOPHIL # BLD: 0 K/UL (ref 0–0.4)
EOSINOPHIL NFR BLD: 0 % (ref 0–7)
EPITH CASTS URNS QL MICRO: ABNORMAL /LPF
ERYTHROCYTE [DISTWIDTH] IN BLOOD BY AUTOMATED COUNT: 13.2 % (ref 11.5–14.5)
GLOBULIN SER CALC-MCNC: 3.6 G/DL (ref 2–4)
GLUCOSE SERPL-MCNC: 108 MG/DL (ref 65–100)
GLUCOSE UR STRIP.AUTO-MCNC: NEGATIVE MG/DL
HCT VFR BLD AUTO: 42 % (ref 35–47)
HGB BLD-MCNC: 14.2 G/DL (ref 11.5–16)
HGB UR QL STRIP: ABNORMAL
HYALINE CASTS URNS QL MICRO: ABNORMAL /LPF (ref 0–5)
IMM GRANULOCYTES # BLD: 0.1 K/UL (ref 0–0.04)
IMM GRANULOCYTES NFR BLD AUTO: 0 % (ref 0–0.5)
INR PPP: 1 (ref 0.9–1.1)
KETONES UR QL STRIP.AUTO: NEGATIVE MG/DL
LEUKOCYTE ESTERASE UR QL STRIP.AUTO: NEGATIVE
LYMPHOCYTES # BLD: 1 K/UL (ref 0.8–3.5)
LYMPHOCYTES NFR BLD: 6 % (ref 12–49)
MAGNESIUM SERPL-MCNC: 2.5 MG/DL (ref 1.6–2.4)
MCH RBC QN AUTO: 30.3 PG (ref 26–34)
MCHC RBC AUTO-ENTMCNC: 33.8 G/DL (ref 30–36.5)
MCV RBC AUTO: 89.7 FL (ref 80–99)
MONOCYTES # BLD: 1.5 K/UL (ref 0–1)
MONOCYTES NFR BLD: 9 % (ref 5–13)
NEUTS SEG # BLD: 13.2 K/UL (ref 1.8–8)
NEUTS SEG NFR BLD: 84 % (ref 32–75)
NITRITE UR QL STRIP.AUTO: NEGATIVE
NRBC # BLD: 0 K/UL (ref 0–0.01)
NRBC BLD-RTO: 0 PER 100 WBC
PH UR STRIP: 6 [PH] (ref 5–8)
PLATELET # BLD AUTO: 333 K/UL (ref 150–400)
PMV BLD AUTO: 9.7 FL (ref 8.9–12.9)
POTASSIUM SERPL-SCNC: 4 MMOL/L (ref 3.5–5.1)
PROT SERPL-MCNC: 8 G/DL (ref 6.4–8.2)
PROT UR STRIP-MCNC: 100 MG/DL
PROTHROMBIN TIME: 10.5 SEC (ref 9–11.1)
RBC # BLD AUTO: 4.68 M/UL (ref 3.8–5.2)
RBC #/AREA URNS HPF: ABNORMAL /HPF (ref 0–5)
SODIUM SERPL-SCNC: 139 MMOL/L (ref 136–145)
SP GR UR REFRACTOMETRY: 1.01 (ref 1–1.03)
TROPONIN I SERPL-MCNC: <0.04 NG/ML
UA: UC IF INDICATED,UAUC: ABNORMAL
UROBILINOGEN UR QL STRIP.AUTO: 0.2 EU/DL (ref 0.2–1)
WBC # BLD AUTO: 15.8 K/UL (ref 3.6–11)
WBC URNS QL MICRO: ABNORMAL /HPF (ref 0–4)

## 2018-04-15 PROCEDURE — 80053 COMPREHEN METABOLIC PANEL: CPT | Performed by: EMERGENCY MEDICINE

## 2018-04-15 PROCEDURE — 93005 ELECTROCARDIOGRAM TRACING: CPT

## 2018-04-15 PROCEDURE — 83735 ASSAY OF MAGNESIUM: CPT | Performed by: EMERGENCY MEDICINE

## 2018-04-15 PROCEDURE — 81001 URINALYSIS AUTO W/SCOPE: CPT | Performed by: EMERGENCY MEDICINE

## 2018-04-15 PROCEDURE — 71045 X-RAY EXAM CHEST 1 VIEW: CPT

## 2018-04-15 PROCEDURE — 36415 COLL VENOUS BLD VENIPUNCTURE: CPT | Performed by: EMERGENCY MEDICINE

## 2018-04-15 PROCEDURE — 85610 PROTHROMBIN TIME: CPT | Performed by: EMERGENCY MEDICINE

## 2018-04-15 PROCEDURE — 85025 COMPLETE CBC W/AUTO DIFF WBC: CPT | Performed by: EMERGENCY MEDICINE

## 2018-04-15 PROCEDURE — 84484 ASSAY OF TROPONIN QUANT: CPT | Performed by: EMERGENCY MEDICINE

## 2018-04-15 PROCEDURE — 99285 EMERGENCY DEPT VISIT HI MDM: CPT

## 2018-04-16 LAB
ATRIAL RATE: 77 BPM
CALCULATED P AXIS, ECG09: 77 DEGREES
CALCULATED R AXIS, ECG10: 37 DEGREES
CALCULATED T AXIS, ECG11: 54 DEGREES
DIAGNOSIS, 93000: NORMAL
P-R INTERVAL, ECG05: 158 MS
Q-T INTERVAL, ECG07: 400 MS
QRS DURATION, ECG06: 84 MS
QTC CALCULATION (BEZET), ECG08: 452 MS
VENTRICULAR RATE, ECG03: 77 BPM

## 2018-04-16 NOTE — ED NOTES
Dr Kay Goodwin has reviewed discharge instructions with the patient. The patient verbalized understanding. Pt. A&Ox4, respirations even and unlabored. VS stable as noted in flowsheet. Pt Declined wheelchair assist from department; paperwork in hand.

## 2018-04-16 NOTE — ED NOTES
Pts specimens sent to lab at this time. Pt aware need for UA. Pt awaiting XR. Call bell within reach. Needs met at this time.

## 2018-04-16 NOTE — ED PROVIDER NOTES
EMERGENCY DEPARTMENT HISTORY AND PHYSICAL EXAM    Date: 4/15/2018  Patient Name: Key Johnson    History of Presenting Illness     Chief Complaint   Patient presents with    Palpitations     pt states, \"I think I'm in a fib. \"       History Provided By: Patient    HPI: Key Johnson is a [de-identified] y.o. female, pmhx HTN, CVA, Hypothyroid, who presents from [facility] to the ED c/o the onset of heart palpitations and mild dizziness x this morning. Pt states her nurses at the facility were monitoring her blood pressure and heart rate throughout the day and prompted her to be evaluated at the ED PTA. Pt notes she does take Metoprolol for he HTN and has a hx of intermittent Afib, noting Dr. Jose Luis Simon has recently changed her dose from 50 mg to 75 mg. She also reports a recent orthopedic surgery in which she had to temporarily stop her Metoprolol, but has since restarted it. Of note, pt states she has been experiencing increased stress lately in since her  is sick in the hospital. Pt specifically denies any fever, congestion, cough, shortness of breath, chest pain, abdominal pain, nausea, vomiting, diarrhea, dysuria, or urinary frequency. PCP: Dominga Snyder MD   Cardiology: Jose Luis Simon MD    PMHx: Significant for HTN, CVA, Hypothyroid  PSHx: Significant for L lumpectomy   Social Hx: -tobacco (former), +EtOH, -Illicit Drugs     There are no other complaints, changes, or physical findings at this time. Current Outpatient Prescriptions   Medication Sig Dispense Refill    apixaban (ELIQUIS) 5 mg tablet Take 1 Tab by mouth two (2) times a day. 60 Tab 11    ARMOUR THYROID 90 mg tablet Take 1 Tab by mouth daily. 0    metoprolol succinate (TOPROL XL) 25 mg XL tablet Take along with 50 mg tab for a total of 75 mg daily 30 Tab 11    metoprolol succinate (TOPROL-XL) 50 mg XL tablet take along with 25 mg tab for a total of 75 mg daily 30 Tab 11    atorvastatin (LIPITOR) 40 mg tablet Take 1 Tab by mouth nightly. 90 Tab 3    OTHER probiotic once a day      MAGNESIUM GLUCONATE 400 mg by Does Not Apply route daily.  OTHER,NON-FORMULARY, Algenol daily      NATURE-THROID 81.25 mg tab Take 1 Tab by mouth nightly.  b complex vitamins tablet Take 1 Tab by mouth daily.  cholecalciferol (VITAMIN D3) 5,000 unit capsule Take 5,000 Units by mouth every Monday, Wednesday, Friday. Past History     Past Medical History:  Past Medical History:   Diagnosis Date    Factor V Leiden mutation ([facility]     Hypertension     Hypothyroid     Migraine     Stroke ([facility] 6/07    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    Uterine cancer ([facility] 1994       Past Surgical History:  Past Surgical History:   Procedure Laterality Date    BREAST SURGERY PROCEDURE UNLISTED  4/02, 3/01    L lumpectomy - atypical cells    HX CATARACT REMOVAL  10/09    right    HX HENNY AND BSO      for uterine cancer       Family History:  Family History   Problem Relation Age of Onset    Dementia Mother 62    Cancer Father      colon    Diabetes Father      type I    Stroke Sister        Social History:  Social History   Substance Use Topics    Smoking status: Former Smoker     Packs/day: 25.00     Types: Cigarettes     Quit date: 1/1/1985    Smokeless tobacco: Never Used    Alcohol use 4.8 oz/week     8 Standard drinks or equivalent per week       Allergies: Allergies   Allergen Reactions    Ampicillin Hives    Celebrex [Celecoxib] Hives    Diltiazem Unknown (comments)         Review of Systems   Review of Systems   Constitutional: Negative. Negative for fever. Eyes: Negative. Respiratory: Negative. Negative for shortness of breath. Cardiovascular: Positive for palpitations. Negative for chest pain. Gastrointestinal: Negative for abdominal pain, nausea and vomiting. Endocrine: Negative. Genitourinary: Negative. Negative for difficulty urinating, dysuria and hematuria. Musculoskeletal: Negative. Skin: Negative. Allergic/Immunologic: Negative. Neurological: Positive for dizziness. Psychiatric/Behavioral: Negative for suicidal ideas. All other systems reviewed and are negative. Physical Exam   Physical Exam   Constitutional: She is oriented to person, place, and time. She appears well-developed and well-nourished. No distress. HENT:   Head: Normocephalic and atraumatic. Nose: Nose normal.   Eyes: Conjunctivae and EOM are normal. No scleral icterus. Neck: Normal range of motion. No tracheal deviation present. Cardiovascular: Normal rate, regular rhythm, normal heart sounds and intact distal pulses. Exam reveals no friction rub. No murmur heard. Pulmonary/Chest: Effort normal and breath sounds normal. No stridor. No respiratory distress. She has no wheezes. She has no rales. Abdominal: Soft. Bowel sounds are normal. She exhibits no distension. There is no tenderness. There is no rebound. Musculoskeletal: Normal range of motion. She exhibits no tenderness. Neurological: She is alert and oriented to person, place, and time. No cranial nerve deficit. Skin: Skin is warm and dry. No rash noted. She is not diaphoretic. Psychiatric: She has a normal mood and affect. Her speech is normal and behavior is normal. Judgment and thought content normal. Cognition and memory are normal.   Nursing note and vitals reviewed.         Diagnostic Study Results     Labs -     Recent Results (from the past 12 hour(s))   EKG, 12 LEAD, INITIAL    Collection Time: 04/15/18  8:38 PM   Result Value Ref Range    Ventricular Rate 77 BPM    Atrial Rate 77 BPM    P-R Interval 158 ms    QRS Duration 84 ms    Q-T Interval 400 ms    QTC Calculation (Bezet) 452 ms    Calculated P Axis 77 degrees    Calculated R Axis 37 degrees    Calculated T Axis 54 degrees    Diagnosis       Normal sinus rhythm  Possible Left atrial enlargement  Left ventricular hypertrophy  Abnormal ECG  When compared with ECG of 28-AUG-2016 08:26,  Nonspecific T wave abnormality no longer evident in Lateral leads     CBC WITH AUTOMATED DIFF    Collection Time: 04/15/18  8:52 PM   Result Value Ref Range    WBC 15.8 (H) 3.6 - 11.0 K/uL    RBC 4.68 3.80 - 5.20 M/uL    HGB 14.2 11.5 - 16.0 g/dL    HCT 42.0 35.0 - 47.0 %    MCV 89.7 80.0 - 99.0 FL    MCH 30.3 26.0 - 34.0 PG    MCHC 33.8 30.0 - 36.5 g/dL    RDW 13.2 11.5 - 14.5 %    PLATELET 598 901 - 455 K/uL    MPV 9.7 8.9 - 12.9 FL    NRBC 0.0 0  WBC    ABSOLUTE NRBC 0.00 0.00 - 0.01 K/uL    NEUTROPHILS 84 (H) 32 - 75 %    LYMPHOCYTES 6 (L) 12 - 49 %    MONOCYTES 9 5 - 13 %    EOSINOPHILS 0 0 - 7 %    BASOPHILS 0 0 - 1 %    IMMATURE GRANULOCYTES 0 0.0 - 0.5 %    ABS. NEUTROPHILS 13.2 (H) 1.8 - 8.0 K/UL    ABS. LYMPHOCYTES 1.0 0.8 - 3.5 K/UL    ABS. MONOCYTES 1.5 (H) 0.0 - 1.0 K/UL    ABS. EOSINOPHILS 0.0 0.0 - 0.4 K/UL    ABS. BASOPHILS 0.0 0.0 - 0.1 K/UL    ABS. IMM. GRANS. 0.1 (H) 0.00 - 0.04 K/UL    DF AUTOMATED     METABOLIC PANEL, COMPREHENSIVE    Collection Time: 04/15/18  8:52 PM   Result Value Ref Range    Sodium 139 136 - 145 mmol/L    Potassium 4.0 3.5 - 5.1 mmol/L    Chloride 106 97 - 108 mmol/L    CO2 25 21 - 32 mmol/L    Anion gap 8 5 - 15 mmol/L    Glucose 108 (H) 65 - 100 mg/dL    BUN 18 6 - 20 MG/DL    Creatinine 0.81 0.55 - 1.02 MG/DL    BUN/Creatinine ratio 22 (H) 12 - 20      GFR est AA >60 >60 ml/min/1.73m2    GFR est non-AA >60 >60 ml/min/1.73m2    Calcium 9.7 8.5 - 10.1 MG/DL    Bilirubin, total 0.6 0.2 - 1.0 MG/DL    ALT (SGPT) 57 12 - 78 U/L    AST (SGOT) 36 15 - 37 U/L    Alk.  phosphatase 75 45 - 117 U/L    Protein, total 8.0 6.4 - 8.2 g/dL    Albumin 4.4 3.5 - 5.0 g/dL    Globulin 3.6 2.0 - 4.0 g/dL    A-G Ratio 1.2 1.1 - 2.2     MAGNESIUM    Collection Time: 04/15/18  8:52 PM   Result Value Ref Range    Magnesium 2.5 (H) 1.6 - 2.4 mg/dL   PROTHROMBIN TIME + INR    Collection Time: 04/15/18  8:52 PM   Result Value Ref Range    INR 1.0 0.9 - 1.1      Prothrombin time 10.5 9.0 - 11.1 sec   TROPONIN I    Collection Time: 04/15/18  8:52 PM   Result Value Ref Range    Troponin-I, Qt. <0.04 <0.05 ng/mL   URINALYSIS W/ REFLEX CULTURE    Collection Time: 04/15/18  9:31 PM   Result Value Ref Range    Color YELLOW/STRAW      Appearance CLEAR CLEAR      Specific gravity 1.010 1.003 - 1.030      pH (UA) 6.0 5.0 - 8.0      Protein 100 (A) NEG mg/dL    Glucose NEGATIVE  NEG mg/dL    Ketone NEGATIVE  NEG mg/dL    Bilirubin NEGATIVE  NEG      Blood MODERATE (A) NEG      Urobilinogen 0.2 0.2 - 1.0 EU/dL    Nitrites NEGATIVE  NEG      Leukocyte Esterase NEGATIVE  NEG      WBC 0-4 0 - 4 /hpf    RBC 5-10 0 - 5 /hpf    Epithelial cells FEW FEW /lpf    Bacteria NEGATIVE  NEG /hpf    UA:UC IF INDICATED CULTURE NOT INDICATED BY UA RESULT CNI      Hyaline cast 0-2 0 - 5 /lpf       Radiologic Studies -     CXR Results  (Last 48 hours)               04/15/18 2105  XR CHEST PORT Final result    Impression:  IMPRESSION: No Acute Disease. Narrative:  EXAM: Portable CXR.  2101 hours. INDICATION: Chest Pain       The lungs are clear. Heart is normal in size. There is no overt pulmonary edema. There is no evident pneumothorax, adenopathy or pleural effusion. There is a   subcutaneous cardiac loop recorder. Medical Decision Making   I am the first provider for this patient. I reviewed the vital signs, available nursing notes, past medical history, past surgical history, family history and social history. Vital Signs-Reviewed the patient's vital signs.   Patient Vitals for the past 12 hrs:   Temp Pulse Resp BP SpO2   04/15/18 2245 - 73 18 (!) 164/115 98 %   04/15/18 2230 - 74 20 (!) 148/100 97 %   04/15/18 2215 - 69 25 (!) 170/98 98 %   04/15/18 2200 - 70 16 (!) 169/104 98 %   04/15/18 2146 - 75 19 (!) 172/99 96 %   04/15/18 2115 - 76 23 (!) 171/116 98 %   04/15/18 2100 - 70 16 (!) 170/107 98 %   04/15/18 2048 - - - - 100 %   04/15/18 2047 - - - (!) 184/109 99 %   04/15/18 2028 98.2 °F (36.8 °C) 96 16 (!) 163/113 100 %       Pulse Oximetry Analysis - 100% on RA    Cardiac Monitor:   Rate: 70 bpm  Rhythm: Normal Sinus Rhythm      Records Reviewed: Nursing Notes, Old Medical Records, Previous Radiology Studies and Previous Laboratory Studies    Provider Notes (Medical Decision Making):     DDX:  afib with rvr, arrhythmia, pvcs, electrolyte abnormality    Plan:  Labs, ekg, ua    Impression:  palpitatiosn    ED Course:   Initial assessment performed. The patients presenting problems have been discussed, and they are in agreement with the care plan formulated and outlined with them. I have encouraged them to ask questions as they arise throughout their visit. I reviewed our electronic medical record system for any past medical records that were available that may contribute to the patients current condition, the nursing notes and and vital signs from today's visit    Nursing notes will be reviewed as they become available in realtime while the pt has been in the ED. Neil Cordoba MD      EKG interpretation 2038: NSR, nl Axis, rate 77; , QRS 84, QTc 452; no acute ischemia; Neil Cordoba MD    I personally reviewed pt's imaging. Official read by radiology listed above. Neil Cordoba MD      10:48 PM  Progress note:  Pt noted to be feeling better, asymptomatic, ready for discharge. Discussed lab and imaging findings with pt and/or family, specifically noting negative CXR, negative troponin, elevated WBC and Magnesium. Discussed the importance of compliance with her blood pressure medication Metoprolol twice a day. Pt will follow up as instructed. All questions have been answered, pt voiced understanding and agreement with plan. If narcotics were prescribed, pt was advised not to drive or operate heavy machinery. If abx were prescribed, pt advised that diarrhea and rash are possible side effects of the medications.      Specific return precautions provided in addition to instructions for pt to return to the ED immediately should sx worsen at any time. Nica Anaya MD      Critical Care Time:     None    PLAN:  1. Current Discharge Medication List        2. Follow-up Information     Follow up With Details Comments Cj Redding MD Schedule an appointment as soon as possible for a visit in 2 days  [address]      Castillo Berry MD Call in 1 day  [facility]  [address] [address]  [facility]  [phone]          Return to ED if worse     Disposition:    10:49 PM   The patient's results have been reviewed with family and/or caregiver. They verbally convey their understanding and agreement of the patient's signs, symptoms, diagnosis, treatment and prognosis and additionally agree to follow up as recommended in the discharge instructions or to return to the Emergency Room should the patient's condition change prior to their follow-up appointment. The family and/or caregiver verbally agrees with the care-plan and all of their questions have been answered. The discharge instructions have also been provided to the them with educational information regarding the patient's diagnosis as well a list of reasons why the patient would want to return to the ER prior to their follow-up appointment should their condition change. Nica Anaya MD      Diagnosis     Clinical Impression:   1. Palpitations    2. Paroxysmal atrial fibrillation (HCC)        Attestations: This note is prepared by Fam Lassiter, acting as Scribe for MD Nica Johnson MD : The scribe's documentation has been prepared under my direction and personally reviewed by me in its entirety. I confirm that the note above accurately reflects all work, treatment, procedures, and medical decision making performed by me. This note will not be viewable in [address]

## 2018-04-16 NOTE — ED NOTES
Dr Bimal Fischer has reviewed discharge instructions with the patient. The patient verbalized understanding. Pt. A&Ox4, respirations even and unlabored. VS stable as noted in flowsheet. Pt Declined wheelchair assist from department; paperwork in hand.

## 2018-04-16 NOTE — ED NOTES
Pt presents c/o palpitations. Pt was sent here from [place] [address]. Nurse Practitioner told her to come to ED due to heart rate and blood pressure. Pt AO x 3. Pt confirms has hx of afib. States she took metoprolol at lunch and admits she has not been compliant with meds this week. Denies chest pain, denies SOB. Pt asymptomatic. Denies generalized pain. Pt COA. Pt on monitors x 3. Call bell within reach.

## 2018-04-16 NOTE — DISCHARGE INSTRUCTIONS
Atrial Fibrillation: Care Instructions  Your Care Instructions    Atrial fibrillation is an irregular and often fast heartbeat. Treating this condition is important for several reasons. It can cause blood clots, which can travel from your heart to your brain and cause a stroke. If you have a fast heartbeat, you may feel lightheaded, dizzy, and weak. An irregular heartbeat can also increase your risk for heart failure. Atrial fibrillation is often the result of another heart condition, such as high blood pressure or coronary artery disease. Making changes to improve your heart condition will help you stay healthy and active. Follow-up care is a key part of your treatment and safety. Be sure to make and go to all appointments, and call your doctor if you are having problems. It's also a good idea to know your test results and keep a list of the medicines you take. How can you care for yourself at home? Medicines  ? · Take your medicines exactly as prescribed. Call your doctor if you think you are having a problem with your medicine. You will get more details on the specific medicines your doctor prescribes. ? · If your doctor has given you a blood thinner to prevent a stroke, be sure you get instructions about how to take your medicine safely. Blood thinners can cause serious bleeding problems. ? · Do not take any vitamins, over-the-counter drugs, or herbal products without talking to your doctor first.   ? Lifestyle changes  ? · Do not smoke. Smoking can increase your chance of a stroke and heart attack. If you need help quitting, talk to your doctor about stop-smoking programs and medicines. These can increase your chances of quitting for good. ? · Eat a heart-healthy diet. ? · Stay at a healthy weight. Lose weight if you need to.   ? · Limit alcohol to 2 drinks a day for men and 1 drink a day for women. Too much alcohol can cause health problems. ? · Avoid colds and flu.  Get a pneumococcal vaccine shot. If you have had one before, ask your doctor whether you need another dose. Get a flu shot every year. If you must be around people with colds or flu, wash your hands often. Activity  ? · If your doctor recommends it, get more exercise. Walking is a good choice. Bit by bit, increase the amount you walk every day. Try for at least 30 minutes on most days of the week. You also may want to swim, bike, or do other activities. Your doctor may suggest that you join a cardiac rehabilitation program so that you can have help increasing your physical activity safely. ? · Start light exercise if your doctor says it is okay. Even a small amount will help you get stronger, have more energy, and manage stress. Walking is an easy way to get exercise. Start out by walking a little more than you did in the hospital. Gradually increase the amount you walk. ? · When you exercise, watch for signs that your heart is working too hard. You are pushing too hard if you cannot talk while you are exercising. If you become short of breath or dizzy or have chest pain, sit down and rest immediately. ? · Check your pulse regularly. Place two fingers on the artery at the palm side of your wrist, in line with your thumb. If your heartbeat seems uneven or fast, talk to your doctor. When should you call for help? Call 911 anytime you think you may need emergency care. For example, call if:  ? · You have symptoms of a heart attack. These may include:  ¨ Chest pain or pressure, or a strange feeling in the chest.  ¨ Sweating. ¨ Shortness of breath. ¨ Nausea or vomiting. ¨ Pain, pressure, or a strange feeling in the back, neck, jaw, or upper belly or in one or both shoulders or arms. ¨ Lightheadedness or sudden weakness. ¨ A fast or irregular heartbeat. After you call 911, the  may tell you to chew 1 adult-strength or 2 to 4 low-dose aspirin. Wait for an ambulance. Do not try to drive yourself.    ? · You have symptoms of a stroke. These may include:  ¨ Sudden numbness, tingling, weakness, or loss of movement in your face, arm, or leg, especially on only one side of your body. ¨ Sudden vision changes. ¨ Sudden trouble speaking. ¨ Sudden confusion or trouble understanding simple statements. ¨ Sudden problems with walking or balance. ¨ A sudden, severe headache that is different from past headaches. ? · You passed out (lost consciousness). ?Call your doctor now or seek immediate medical care if:  ? · You have new or increased shortness of breath. ? · You feel dizzy or lightheaded, or you feel like you may faint. ? · Your heart rate becomes irregular. ? · You can feel your heart flutter in your chest or skip heartbeats. Tell your doctor if these symptoms are new or worse. ? Watch closely for changes in your health, and be sure to contact your doctor if you have any problems. Where can you learn more? Go to http://heidy-damir.info/. Enter U020 in the search box to learn more about \"Atrial Fibrillation: Care Instructions. \"  Current as of: September 21, 2016  Content Version: 11.4  © 7159-4403 [employer]. Care instructions adapted under license by [employer] (which disclaims liability or warranty for this information). If you have questions about a medical condition or this instruction, always ask your healthcare professional. [address] any warranty or liability for your use of this information.

## 2018-04-17 ENCOUNTER — OFFICE VISIT (OUTPATIENT)
Dept: CARDIOLOGY CLINIC | Age: 78
End: 2018-04-17

## 2018-04-17 VITALS
SYSTOLIC BLOOD PRESSURE: 156 MMHG | HEIGHT: 64 IN | RESPIRATION RATE: 16 BRPM | BODY MASS INDEX: 17.52 KG/M2 | HEART RATE: 88 BPM | DIASTOLIC BLOOD PRESSURE: 94 MMHG | WEIGHT: 102.6 LBS

## 2018-04-17 DIAGNOSIS — I35.1 AORTIC VALVE INSUFFICIENCY, ETIOLOGY OF CARDIAC VALVE DISEASE UNSPECIFIED: ICD-10-CM

## 2018-04-17 DIAGNOSIS — Z87.891 HISTORY OF TOBACCO USE: ICD-10-CM

## 2018-04-17 DIAGNOSIS — Z86.73 HISTORY OF STROKE: ICD-10-CM

## 2018-04-17 DIAGNOSIS — Z79.01 CHRONIC ANTICOAGULATION: ICD-10-CM

## 2018-04-17 DIAGNOSIS — I10 BENIGN ESSENTIAL HTN: ICD-10-CM

## 2018-04-17 DIAGNOSIS — I48.0 PAROXYSMAL A-FIB (HCC): Primary | ICD-10-CM

## 2018-04-17 RX ORDER — LOSARTAN POTASSIUM 25 MG/1
25 TABLET ORAL DAILY
Qty: 30 TAB | Refills: 11 | Status: SHIPPED | OUTPATIENT
Start: 2018-04-17

## 2018-04-17 RX ORDER — LOSARTAN POTASSIUM 25 MG/1
TABLET ORAL DAILY
COMMUNITY
End: 2018-04-17 | Stop reason: SDUPTHER

## 2018-04-17 NOTE — MR AVS SNAPSHOT
[address] [facility] [address] [place] 
[phone] Patient: Monica Ruffin MRN: [id number] PNS:[id number] Visit Information Date & Time Provider Department Dept. Phone Encounter #  
 4/17/2018  1:00 PM Socrates Bonilla MD [facility] [phone] [id number] Your Appointments 10/22/2018  8:40 AM  
ESTABLISHED PATIENT with Socrates Bonilla MD  
[facility] ([facility]) Appt Note: one year follow up; 6 month follow up  
 [address] [facility] [address] [place] [address]  
[address] [facility] 7 [address] Upcoming Health Maintenance Date Due COLONOSCOPY 2/1/1958 GLAUCOMA SCREENING Q2Y 4/1/2017 Influenza Age 5 to Adult 8/1/2017 Pneumococcal 65+ Low/Medium Risk (2 of 2 - PPSV23) 12/30/2017 MEDICARE YEARLY EXAM 6/17/2018 DTaP/Tdap/Td series (2 - Td) 5/6/2025 Allergies as of 4/17/2018  Review Complete On: 4/17/2018 By: Socrates Bonilla MD  
  
 Severity Noted Reaction Type Reactions Ampicillin  09/14/2011    Hives Celebrex [Celecoxib]  09/14/2011    Hives Diltiazem  08/28/2016    Unknown (comments) Current Immunizations  Reviewed on 12/2/2013 Name Date Influenza Vaccine  Deferred (Patient Refused) PPD 4/30/2012 Pneumococcal Polysaccharide (PPSV-23)  Deferred (Patient Refused) Tdap 5/6/2015  1:37 PM  
  
 Not reviewed this visit Vitals BP Pulse Resp Height(growth percentile) Weight(growth percentile) BMI  
 (!) 156/94 (BP 1 Location: Left arm) 88 16 5' 4\" (1.626 m) 102 lb 9.6 oz (46.5 kg) 17.61 kg/m2 OB Status Smoking Status Hysterectomy Former Smoker Vitals History BMI and BSA Data Body Mass Index Body Surface Area  
 17.61 kg/m 2 1.45 m 2 Preferred Pharmacy Pharmacy Name Phone [address], [address] [facility] [phone] Your Updated Medication List  
  
   
This list is accurate as of 4/17/18  1:47 PM.  Always use your most recent med list.  
  
  
  
  
 apixaban 5 mg tablet Commonly known as:  Akle Shahla Take 1 Tab by mouth two (2) times a day. atorvastatin 40 mg tablet Commonly known as:  LIPITOR Take 1 Tab by mouth nightly. b complex vitamins tablet Take 1 Tab by mouth daily. cholecalciferol 5,000 unit capsule Commonly known as:  VITAMIN D3 Take 5,000 Units by mouth every Monday, Wednesday, Friday. losartan 25 mg tablet Commonly known as:  COZAAR Take 1 Tab by mouth daily. MAGNESIUM GLUCONATE  
400 mg by Does Not Apply route daily. * metoprolol succinate 25 mg XL tablet Commonly known as:  TOPROL XL Take along with 50 mg tab for a total of 75 mg daily * metoprolol succinate 50 mg XL tablet Commonly known as:  TOPROL-XL  
take along with 25 mg tab for a total of 75 mg daily NATURE-THROID 81.25 mg Tab Generic drug:  thyroid (pork) Take 1 Tab by mouth nightly. * Notice: This list has 2 medication(s) that are the same as other medications prescribed for you. Read the directions carefully, and ask your doctor or other care provider to review them with you. Prescriptions Sent to Pharmacy Refills  
 losartan (COZAAR) 25 mg tablet 11 Sig: Take 1 Tab by mouth daily. Class: Normal  
 Pharmacy: [address] [facility], [address] [place] [address] #: [phone] Route: Oral  
  
Introducing [facility]! Dear Trace Calvillo: Thank you for requesting a [employer] account. Our records indicate that you already have an active [employer] account. You can access your account anytime at [email or web address] Did you know that you can access your hospital and ER discharge instructions at any time in [employer]? You can also review all of your test results from your hospital stay or ER visit. Additional Information If you have questions, please visit the Frequently Asked Questions section of the [employer] website at [email or web address] Remember, [employer] is NOT to be used for urgent needs. For medical emergencies, dial 911. Now available from your iPhone and Android! Please provide this summary of care documentation to your next provider. Your primary care clinician is listed as Terrence Stewart. If you have any questions after today's visit, please call [phone].

## 2018-04-17 NOTE — LETTER
4/18/2018 9:24 AM 
 
Patient:  Tunde Tinoco YOB: 1940 Date of Visit: 4/17/2018 Dear Dagoberto Davila MD 
[address] [place] [id number] VIA Facsimile: [phone] 
 : 
 
 
 Ms. Juliet Mathews is a 77 yo F with remote history of tobacco, CVA in 11/2015 with at the time right sided deficits, hypertension, seen for moderate to severe aortic regurgitation on echo, afib on BB for rate control and eliquis for [address] [place] [address] Road. 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 It was thought at that time that she may have had a stress induced cardiomyopathy, as she had some hypokinesis of the apex. On her echocardiogram in 12/2015, she did not have any wall motion abnormality and had preserved LV function. Stressors with her 's health. She went to the hospital on 04/15/2018. She had palpitations, but her atrial fibrillation was well controlled there. They also noted her blood pressure was elevated. She has not been sleeping well. She denies any chest pain and her breathing has been stable. She has occasional palpitations. She is compensated on exam with clear lungs and no lower extremity edema. Blood pressure is 156/54 and a heart rate of 88. Soc hx. Tob quit 27 yo.  [facility]. Assessment and Plan: 1. Paroxysmal atrial fibrillation. Stable on Metoprolol for rate control. Will have her follow back in five to six months. 2. Essential hypertension. Blood pressure has been elevated. Could be partly due to various stressors. At least for now, I think it is reasonable to add Losartan 25 mg once daily. 3. Aortic regurgitation, mild to moderate in severity by echocardiogram.  Consider repeat echocardiogram in two years. 4. Remote tobacco use. 5. History of CVA in 11/2015. 6. Oral anticoagulation. No bleeding issues on Eliquis. If you have questions, please do not hesitate to call me. Sincerely, Mia Mckinney MD

## 2018-04-17 NOTE — PROGRESS NOTES
[facility] [place]: Benja Roth  [phone]    History of Present Illness:   Ms. Lesvia Meeks is a 77 yo F with remote history of tobacco, CVA in 11/2015 with at the time right sided deficits, hypertension, seen for moderate to severe aortic regurgitation on echo, afib on BB for rate control and eliquis for [address] 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 It was thought at that time that she may have had a stress induced cardiomyopathy, as she had some hypokinesis of the apex. On her echocardiogram in 12/2015, she did not have any wall motion abnormality and had preserved LV function. Stressors with her 's health. She went to the hospital on 04/15/2018. She had palpitations, but her atrial fibrillation was well controlled there. They also noted her blood pressure was elevated. She has not been sleeping well. She denies any chest pain and her breathing has been stable. She has occasional palpitations. She is compensated on exam with clear lungs and no lower extremity edema. Blood pressure is 156/54 and a heart rate of 88. Soc hx. Tob quit 27 yo.  [facility]. Assessment and Plan:   1. Paroxysmal atrial fibrillation. Stable on Metoprolol for rate control. Will have her follow back in five to six months. 2. Essential hypertension. Blood pressure has been elevated. Could be partly due to various stressors. At least for now, I think it is reasonable to add Losartan 25 mg once daily. 3. Aortic regurgitation, mild to moderate in severity by echocardiogram.  Consider repeat echocardiogram in two years. 4. Remote tobacco use. 5. History of CVA in 11/2015. 6. Oral anticoagulation. No bleeding issues on Eliquis. She  has a past medical history of Factor V Leiden mutation ([facility] Hypertension; Hypothyroid; Migraine; Stroke ([facility] (6/07); and Uterine cancer ([facility] (1994).     All other systems negative except as above. PE  Vitals:    04/17/18 1322   BP: (!) 156/94   Pulse: 88   Resp: 16   Weight: 102 lb 9.6 oz (46.5 kg)   Height: 5' 4\" (1.626 m)    Body mass index is 17.61 kg/(m^2).    General appearance - alert, well appearing, and in no distress  Mental status - affect appropriate to mood  Eyes - sclera anicteric, moist mucous membranes  Neck - supple, no JVD  Chest - clear to auscultation, no wheezes, rales or rhonchi  Heart - normal rate, regular rhythm, normal S1, S2, I-II/VI systolic murmur LUSB  Abdomen - soft, nontender, nondistended, no masses or organomegaly  Extremities - peripheral pulses normal, no pedal edema      Recent Labs:  Lab Results   Component Value Date/Time    Cholesterol, total 172 08/28/2016 09:23 AM    HDL Cholesterol 60 08/28/2016 09:23 AM    LDL cholesterol See Note 01/14/2010 08:37 AM    LDL, calculated 92.6 08/28/2016 09:23 AM    Triglyceride 97 08/28/2016 09:23 AM    CHOL/HDL Ratio 2.9 08/28/2016 09:23 AM     Lab Results   Component Value Date/Time    Creatinine (POC) 0.5 (L) 08/28/2016 08:47 AM    Creatinine 0.81 04/15/2018 08:52 PM     Lab Results   Component Value Date/Time    BUN 18 04/15/2018 08:52 PM    BUN (POC) 13 08/28/2016 08:47 AM     Lab Results   Component Value Date/Time    Potassium 4.0 04/15/2018 08:52 PM     Lab Results   Component Value Date/Time    Hemoglobin A1c 5.2 08/28/2016 09:28 AM     Lab Results   Component Value Date/Time    Hemoglobin (POC) 13.3 08/28/2016 08:47 AM    HGB 14.2 04/15/2018 08:52 PM     Lab Results   Component Value Date/Time    PLATELET 494 88/33/1771 08:52 PM       Reviewed:  Past Medical History:   Diagnosis Date    Factor V Leiden mutation ([facility]     Hypertension     Hypothyroid     Migraine     [facility]07    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  metoprolol succinate (TOPROL XL) 25 mg XL tablet Take along with 50 mg tab for a total of 75 mg daily    metoprolol succinate (TOPROL-XL) 50 mg XL tablet take along with 25 mg tab for a total of 75 mg daily    atorvastatin (LIPITOR) 40 mg tablet Take 1 Tab by mouth nightly.  MAGNESIUM GLUCONATE 400 mg by Does Not Apply route daily.  NATURE-THROID 81.25 mg tab Take 1 Tab by mouth nightly.  b complex vitamins tablet Take 1 Tab by mouth daily.  cholecalciferol (VITAMIN D3) 5,000 unit capsule Take 5,000 Units by mouth every Monday, Wednesday, Friday. No current facility-administered medications for this visit.         MD Avelino Krueger [facility] heart and Vascular [place]  [address], [address]  [facility], [address]

## 2018-04-18 PROBLEM — I48.0 PAROXYSMAL A-FIB (HCC): Status: ACTIVE | Noted: 2018-04-18

## 2018-04-18 PROBLEM — I35.1 AORTIC VALVE REGURGITATION: Status: ACTIVE | Noted: 2018-04-18

## 2018-04-18 PROBLEM — Z79.01 CHRONIC ANTICOAGULATION: Status: ACTIVE | Noted: 2018-04-18

## 2018-04-30 ENCOUNTER — CLINICAL SUPPORT (OUTPATIENT)
Dept: CARDIOLOGY CLINIC | Age: 78
End: 2018-04-30

## 2018-04-30 DIAGNOSIS — Z95.818 STATUS POST PLACEMENT OF IMPLANTABLE LOOP RECORDER: Primary | ICD-10-CM

## 2018-05-29 ENCOUNTER — OFFICE VISIT (OUTPATIENT)
Dept: CARDIOLOGY CLINIC | Age: 78
End: 2018-05-29

## 2018-05-29 DIAGNOSIS — Z95.818 STATUS POST PLACEMENT OF IMPLANTABLE LOOP RECORDER: Primary | ICD-10-CM

## 2018-05-30 ENCOUNTER — DOCUMENTATION ONLY (OUTPATIENT)
Dept: CARDIOLOGY CLINIC | Age: 78
End: 2018-05-30

## 2018-06-06 ENCOUNTER — OFFICE VISIT (OUTPATIENT)
Dept: CARDIOLOGY CLINIC | Age: 78
End: 2018-06-06

## 2018-06-06 DIAGNOSIS — Z95.818 STATUS POST PLACEMENT OF IMPLANTABLE LOOP RECORDER: Primary | ICD-10-CM

## 2018-09-21 RX ORDER — ATORVASTATIN CALCIUM 40 MG/1
TABLET, FILM COATED ORAL
Qty: 90 TAB | Refills: 0 | OUTPATIENT
Start: 2018-09-21

## 2018-10-19 NOTE — PROGRESS NOTES
[facility] [place]: Cindy White  ) [phone]    History of Present Illness:   Ms. Ginger Rosario is a [de-identified] yo F with remote history of tobacco, CVA in 11/2015 with at the time right sided deficits, hypertension, seen for moderate to severe aortic regurgitation on echo, afib on BB for rate control and eliquis for [address] 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 It was thought at that time that she may have had a stress induced cardiomyopathy, as she had some hypokinesis of the apex. On her echocardiogram in 12/2015, she did not have any wall motion abnormality and had preserved LV function. Since her last visit, she continues to do well. She denies any chest pain, shortness of breath, palpitations, lightheadedness or dizziness. She is mostly still dealing with her  who passed away in June of this year. He had a lot of issues with orthostatic hypotension and frequent falls. She is compensated on exam with clear lungs and no lower extremity edema, a I/VI diastolic murmur at the left upper sternal border. Her blood pressure is 134/84 with a heart rate of 66. Soc hx. Tob quit 25 yo.  [facility]. Assessment and Plan:   1. Paroxysmal atrial fibrillation. Stable on Metoprolol for rate control. 2. Chronic anticoagulation. No bleeding issues on Eliquis. 3. Aortic regurgitation. Mild to moderate in severity in 2017. Will have her follow back in six months with a same day echocardiogram to reassess. 4. Essential hypertension. Blood pressure is controlled and no changes made. 5. Remote tobacco use. 6. History of CVA in 2015. She  has a past medical history of Factor V Leiden mutation ([facility] Hypertension, Hypothyroid, Migraine, Stroke ([facility] and Uterine cancer ([facility] All other systems negative except as above.      PE  Vitals:    10/22/18 0852   BP: 134/84   Pulse: 66   Resp: 16 Weight: 109 lb 12.8 oz (49.8 kg)   Height: 5' 4\" (1.626 m)    Body mass index is 18.85 kg/m².    General appearance - alert, well appearing, and in no distress  Mental status - affect appropriate to mood  Eyes - sclera anicteric, moist mucous membranes  Neck - supple, no JVD  Chest - clear to auscultation, no wheezes, rales or rhonchi  Heart - normal rate, regular rhythm, normal S1, S2, I-II/VI systolic murmur LUSB  Abdomen - soft, nontender, nondistended, no masses or organomegaly  Extremities - peripheral pulses normal, no pedal edema      Recent Labs:  Lab Results   Component Value Date/Time    Cholesterol, total 172 2016 09:23 AM    HDL Cholesterol 60 2016 09:23 AM    LDL cholesterol See Note 2010 08:37 AM    LDL, calculated 92.6 2016 09:23 AM    Triglyceride 97 2016 09:23 AM    CHOL/HDL Ratio 2.9 2016 09:23 AM     Lab Results   Component Value Date/Time    Creatinine (POC) 0.5 (L) 2016 08:47 AM    Creatinine 0.81 04/15/2018 08:52 PM     Lab Results   Component Value Date/Time    BUN 18 04/15/2018 08:52 PM    BUN (POC) 13 2016 08:47 AM     Lab Results   Component Value Date/Time    Potassium 4.0 04/15/2018 08:52 PM     Lab Results   Component Value Date/Time    Hemoglobin A1c 5.2 2016 09:28 AM     Lab Results   Component Value Date/Time    Hemoglobin (POC) 13.3 2016 08:47 AM    HGB 14.2 04/15/2018 08:52 PM     Lab Results   Component Value Date/Time    PLATELET 161  08:52 PM       Reviewed:  Past Medical History:   Diagnosis Date    [facility] V Leiden mutation ([facility]     Hypertension     Hypothyroid     Migraine     [facility]     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    [facility]      Social History     Tobacco Use   Smoking Status Former Smoker    Packs/day: 25.00    Types: Cigarettes    Last attempt to quit: 1985    Years since quittin.8   Smokeless Tobacco Never Used     Social History     Substance and Sexual Activity   Alcohol Use Yes    Alcohol/week: 4.8 oz    Types: 8 Standard drinks or equivalent per week    Comment: occasional     Allergies   Allergen Reactions    Ampicillin Hives    Celebrex [Celecoxib] Hives    Diltiazem Unknown (comments)       Current Outpatient Medications   Medication Sig    apixaban (ELIQUIS) 5 mg tablet Take 1 Tab by mouth two (2) times a day.  metoprolol succinate (TOPROL XL) 25 mg XL tablet Take along with 50 mg tab for a total of 75 mg daily    metoprolol succinate (TOPROL-XL) 50 mg XL tablet take along with 25 mg tab for a total of 75 mg daily    atorvastatin (LIPITOR) 40 mg tablet Take 1 Tab by mouth nightly.  MAGNESIUM GLUCONATE 400 mg by Does Not Apply route daily.  NATURE-THROID 81.25 mg tab Take 1 Tab by mouth nightly.  cholecalciferol (VITAMIN D3) 5,000 unit capsule Take 5,000 Units by mouth every Monday, Wednesday, Friday.  losartan (COZAAR) 25 mg tablet Take 1 Tab by mouth daily.  b complex vitamins tablet Take 1 Tab by mouth daily. No current facility-administered medications for this visit.         Felice Whitten MD  [facility] heart and Vascular [place]  [address] [facility] [address]  [address]

## 2018-10-22 ENCOUNTER — OFFICE VISIT (OUTPATIENT)
Dept: CARDIOLOGY CLINIC | Age: 78
End: 2018-10-22

## 2018-10-22 VITALS
HEART RATE: 66 BPM | HEIGHT: 64 IN | RESPIRATION RATE: 16 BRPM | DIASTOLIC BLOOD PRESSURE: 84 MMHG | BODY MASS INDEX: 18.74 KG/M2 | SYSTOLIC BLOOD PRESSURE: 134 MMHG | WEIGHT: 109.8 LBS

## 2018-10-22 DIAGNOSIS — Z87.891 HISTORY OF TOBACCO USE: ICD-10-CM

## 2018-10-22 DIAGNOSIS — I10 BENIGN ESSENTIAL HTN: ICD-10-CM

## 2018-10-22 DIAGNOSIS — I48.0 PAROXYSMAL A-FIB (HCC): Primary | ICD-10-CM

## 2018-10-22 DIAGNOSIS — Z79.01 CHRONIC ANTICOAGULATION: ICD-10-CM

## 2018-10-22 DIAGNOSIS — I35.1 AORTIC VALVE INSUFFICIENCY, ETIOLOGY OF CARDIAC VALVE DISEASE UNSPECIFIED: ICD-10-CM

## 2018-10-22 NOTE — LETTER
10/22/2018 3:13 PM 
 
Patient:  Maura Leroy YOB: 1940 Date of Visit: 10/22/2018 Dear Scarlet Campa MD 
[address] [address] VIA Facsimile: [phone] 
 : 
 
Ms. Ginger Rosario is a [de-identified] yo F with remote history of tobacco, CVA in 11/2015 with at the time right sided deficits, hypertension, seen for moderate to severe aortic regurgitation on echo, afib on BB for rate control and eliquis for [address] [place] [address] Road. 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 It was thought at that time that she may have had a stress induced cardiomyopathy, as she had some hypokinesis of the apex. On her echocardiogram in 12/2015, she did not have any wall motion abnormality and had preserved LV function. Since her last visit, she continues to do well. She denies any chest pain, shortness of breath, palpitations, lightheadedness or dizziness. She is mostly still dealing with her  who passed away in June of this year. He had a lot of issues with orthostatic hypotension and frequent falls. She is compensated on exam with clear lungs and no lower extremity edema, a I/VI diastolic murmur at the left upper sternal border. Her blood pressure is 134/84 with a heart rate of 66. Soc hx. Tob quit 25 yo.  [facility]. Assessment and Plan: 1. Paroxysmal atrial fibrillation. Stable on Metoprolol for rate control. 2. Chronic anticoagulation. No bleeding issues on Eliquis. 3. Aortic regurgitation. Mild to moderate in severity in 2017. Will have her follow back in six months with a same day echocardiogram to reassess. 4. Essential hypertension. Blood pressure is controlled and no changes made. 5. Remote tobacco use. 6. History of CVA in 2015. If you have questions, please do not hesitate to call me. Sincerely, Ailyn Reeves MD

## 2018-11-14 ENCOUNTER — OFFICE VISIT (OUTPATIENT)
Dept: CARDIOLOGY CLINIC | Age: 78
End: 2018-11-14

## 2018-11-14 DIAGNOSIS — Z95.818 STATUS POST PLACEMENT OF IMPLANTABLE LOOP RECORDER: Primary | ICD-10-CM

## 2019-01-24 ENCOUNTER — DOCUMENTATION ONLY (OUTPATIENT)
Dept: CARDIOLOGY CLINIC | Age: 79
End: 2019-01-24

## 2019-01-24 NOTE — PROGRESS NOTES
ILR showed 35 seconds Atrial flutter/fibrillation V rate average 158 bpm on 1/18/2019    She is on appropriate medications, continue to monitor as this is short episode    Current Outpatient Medications on File Prior to Visit   Medication Sig Dispense Refill    metoprolol succinate (TOPROL-XL) 50 mg XL tablet TAKE 1 TABLET BY MOUTH ALONG WITH 25MG TABLET FOR A TOTAL OF 75MG DAILY. 30 Tab 11    metoprolol succinate (TOPROL-XL) 25 mg XL tablet TAKE 1 TABLET BY MOUTH ALONG WITH 50MG TABLET FOR A TOTAL OF 75MG DAILY. 30 Tab 11    losartan (COZAAR) 25 mg tablet Take 1 Tab by mouth daily. 30 Tab 11    apixaban (ELIQUIS) 5 mg tablet Take 1 Tab by mouth two (2) times a day. 60 Tab 11    atorvastatin (LIPITOR) 40 mg tablet Take 1 Tab by mouth nightly. 90 Tab 3    MAGNESIUM GLUCONATE 400 mg by Does Not Apply route daily.  NATURE-THROID 81.25 mg tab Take 1 Tab by mouth nightly.  b complex vitamins tablet Take 1 Tab by mouth daily.  cholecalciferol (VITAMIN D3) 5,000 unit capsule Take 5,000 Units by mouth every Monday, Wednesday, Friday. No current facility-administered medications on file prior to visit.

## 2019-02-04 ENCOUNTER — OFFICE VISIT (OUTPATIENT)
Dept: CARDIOLOGY CLINIC | Age: 79
End: 2019-02-04

## 2019-02-04 DIAGNOSIS — Z95.818 STATUS POST PLACEMENT OF IMPLANTABLE LOOP RECORDER: Primary | ICD-10-CM

## 2019-03-06 ENCOUNTER — OFFICE VISIT (OUTPATIENT)
Dept: CARDIOLOGY CLINIC | Age: 79
End: 2019-03-06

## 2019-03-06 DIAGNOSIS — Z95.818 STATUS POST PLACEMENT OF IMPLANTABLE LOOP RECORDER: Primary | ICD-10-CM

## 2019-04-16 ENCOUNTER — OFFICE VISIT (OUTPATIENT)
Dept: CARDIOLOGY CLINIC | Age: 79
End: 2019-04-16

## 2019-04-16 DIAGNOSIS — Z95.818 STATUS POST PLACEMENT OF IMPLANTABLE LOOP RECORDER: Primary | ICD-10-CM

## 2019-04-25 ENCOUNTER — OFFICE VISIT (OUTPATIENT)
Dept: CARDIOLOGY CLINIC | Age: 79
End: 2019-04-25

## 2019-04-25 VITALS
HEART RATE: 64 BPM | WEIGHT: 106 LBS | RESPIRATION RATE: 16 BRPM | OXYGEN SATURATION: 97 % | HEIGHT: 64 IN | DIASTOLIC BLOOD PRESSURE: 60 MMHG | SYSTOLIC BLOOD PRESSURE: 110 MMHG | BODY MASS INDEX: 18.1 KG/M2

## 2019-04-25 DIAGNOSIS — I48.0 PAROXYSMAL A-FIB (HCC): Primary | ICD-10-CM

## 2019-04-25 DIAGNOSIS — Z79.01 CHRONIC ANTICOAGULATION: ICD-10-CM

## 2019-04-25 DIAGNOSIS — Z95.818 STATUS POST PLACEMENT OF IMPLANTABLE LOOP RECORDER: ICD-10-CM

## 2019-04-25 DIAGNOSIS — Z87.891 HISTORY OF TOBACCO USE: ICD-10-CM

## 2019-04-25 DIAGNOSIS — I10 BENIGN ESSENTIAL HTN: ICD-10-CM

## 2019-04-25 NOTE — PROGRESS NOTES
[facility]: [place]  [phone]    History of Present Illness:   Ms. Jeannine Wiseman is a 77 yo F with remote history of tobacco, CVA in 11/2015 with at the time right sided deficits, hypertension, seen for moderate to severe aortic regurgitation on echo, afib on BB for rate control and eliquis for [address]. 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 It was thought at that time that she may have had a stress induced cardiomyopathy, as she had some hypokinesis of the apex. On her echocardiogram in 12/2015, she did not have any wall motion abnormality and had preserved LV function. She has been doing well. She denies any chest pain, shortness of breath, palpitations, lightheadedness or dizziness. She is trying to stay engaged at where she is at [facility] and she does have friends there. She does try to exercise regular and goes [place] [address]. Her loop monitor in January did show a brief episode of atrial flutter, but Dr. Elizabeth Hou reviewed this and noted that since it was short, no changes were needed. She is compensated on exam with clear lungs and no lower extremity edema, a I/VI diastolic murmur at the left upper sternal border. Blood pressure is 110/60 with a heart rate of 64. Her echocardiogram today was unchanged with preserved LV function and mild to moderate aortic regurgitation and we reviewed the results. Soc hx. Tob quit 27 yo.  [place] [facility]. Assessment and Plan:    1. Aortic regurgitation. Mild to moderate in severity and unchanged since the last time. Will have her follow back in six months. Consider repeat echocardiogram in one year. 2. Paroxysmal atrial fibrillation. Stable on Metoprolol for rate control. 3. Chronic anticoagulation. No bleeding issues on Eliquis. 4. Essential hypertension. Blood pressure is controlled.     5. Remote tobacco use. 6. History of CVA in 2015. She  has a past medical history of Factor V Leiden mutation [facility]), Hypertension, Hypothyroid, Migraine, Stroke ([facility] (6/07), and Uterine cancer ([facility] (1994). All other systems negative except as above. PE  Vitals:    04/25/19 0941   BP: 110/60   Pulse: 64   Resp: 16   SpO2: 97%   Weight: 106 lb (48.1 kg)   Height: 5' 4\" (1.626 m)    Body mass index is 18.19 kg/m².    General appearance - alert, well appearing, and in no distress  Mental status - affect appropriate to mood  Eyes - sclera anicteric, moist mucous membranes  Neck - supple, no JVD  Chest - clear to auscultation, no wheezes, rales or rhonchi  Heart - normal rate, regular rhythm, normal S1, S2, I-II/VI systolic murmur LUSB  Abdomen - soft, nontender, nondistended, no masses or organomegaly  Extremities - peripheral pulses normal, no pedal edema      Recent Labs:  Lab Results   Component Value Date/Time    Cholesterol, total 172 08/28/2016 09:23 AM    HDL Cholesterol 60 08/28/2016 09:23 AM    LDL cholesterol See Note 01/14/2010 08:37 AM    LDL, calculated 92.6 08/28/2016 09:23 AM    Triglyceride 97 08/28/2016 09:23 AM    CHOL/HDL Ratio 2.9 08/28/2016 09:23 AM     Lab Results   Component Value Date/Time    Creatinine (POC) 0.5 (L) 08/28/2016 08:47 AM    Creatinine 0.81 04/15/2018 08:52 PM     Lab Results   Component Value Date/Time    BUN 18 04/15/2018 08:52 PM    BUN (POC) 13 08/28/2016 08:47 AM     Lab Results   Component Value Date/Time    Potassium 4.0 04/15/2018 08:52 PM     Lab Results   Component Value Date/Time    Hemoglobin A1c 5.2 08/28/2016 09:28 AM     Lab Results   Component Value Date/Time    Hemoglobin (POC) 13.3 08/28/2016 08:47 AM    HGB 14.2 04/15/2018 08:52 PM     Lab Results   Component Value Date/Time    PLATELET 420 88/69/0654 08:52 PM       Reviewed:  Past Medical History:   Diagnosis Date    [facility]     Hypertension     Hypothyroid     Migraine     Stroke ([facility]     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    Uterine cancer ([facility]      Social History     Tobacco Use   Smoking Status Former Smoker    Packs/day: 25.00    Types: Cigarettes    Last attempt to quit: 1985    Years since quittin.3   Smokeless Tobacco Never Used     Social History     Substance and Sexual Activity   Alcohol Use Yes    Alcohol/week: 4.8 oz    Types: 8 Standard drinks or equivalent per week    Comment: occasional     Allergies   Allergen Reactions    Ampicillin Hives    Celebrex [Celecoxib] Hives    Diltiazem Unknown (comments)       Current Outpatient Medications   Medication Sig    metoprolol succinate (TOPROL-XL) 50 mg XL tablet TAKE 1 TABLET BY MOUTH ALONG WITH 25MG TABLET FOR A TOTAL OF 75MG DAILY.  metoprolol succinate (TOPROL-XL) 25 mg XL tablet TAKE 1 TABLET BY MOUTH ALONG WITH 50MG TABLET FOR A TOTAL OF 75MG DAILY.  apixaban (ELIQUIS) 5 mg tablet Take 1 Tab by mouth two (2) times a day.  atorvastatin (LIPITOR) 40 mg tablet Take 1 Tab by mouth nightly.  MAGNESIUM GLUCONATE 400 mg by Does Not Apply route daily.  NATURE-THROID 81.25 mg tab Take 1 Tab by mouth nightly.  cholecalciferol (VITAMIN D3) 5,000 unit capsule Take 5,000 Units by mouth every Monday, Wednesday, Friday.  losartan (COZAAR) 25 mg tablet Take 1 Tab by mouth daily.  b complex vitamins tablet Take 1 Tab by mouth daily. No current facility-administered medications for this visit.         Stevan Conroy MD  [facility] heart and Vascular [place]  [address], [address] [facility] [address]  [address]

## 2019-04-25 NOTE — LETTER
4/28/2019 11:01 PM 
 
Patient:  Jw Villafuerte YOB: 1940 Date of Visit: 4/25/2019 Dear Bethanie Amaya MD 
[address] [id number] VIA Facsimile: [phone] 
 : 
Ms. Yevgeniy Cunha is a 77 yo F with remote history of tobacco, CVA in 11/2015 with at the time right sided deficits, hypertension, seen for moderate to severe aortic regurgitation on echo, afib on BB for rate control and eliquis for [address] 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 It was thought at that time that she may have had a stress induced cardiomyopathy, as she had some hypokinesis of the apex. On her echocardiogram in 12/2015, she did not have any wall motion abnormality and had preserved LV function. She has been doing well. She denies any chest pain, shortness of breath, palpitations, lightheadedness or dizziness. She is trying to stay engaged at where she is at [facility] and she does have friends there. She does try to exercise regular and goes for walks [place] [address]. Her loop monitor in January did show a brief episode of atrial flutter, but Dr. Drake Hernández reviewed this and noted that since it was short, no changes were needed. She is compensated on exam with clear lungs and no lower extremity edema, a I/VI diastolic murmur at the left upper sternal border. Blood pressure is 110/60 with a heart rate of 64. Her echocardiogram today was unchanged with preserved LV function and mild to moderate aortic regurgitation and we reviewed the results. Soc hx. Tob quit 27 yo.  [place] [facility]. Assessment and Plan: 1. Aortic regurgitation. Mild to moderate in severity and unchanged since the last time. Will have her follow back in six months. Consider repeat echocardiogram in one year. 2. Paroxysmal atrial fibrillation. Stable on Metoprolol for rate control. 3. Chronic anticoagulation. No bleeding issues on Eliquis. 4. Essential hypertension. Blood pressure is controlled. 5. Remote tobacco use. 6. History of CVA in 2015. If you have questions, please do not hesitate to call me. Sincerely, Le Mathias MD

## 2019-04-26 RX ORDER — APIXABAN 5 MG/1
TABLET, FILM COATED ORAL
Qty: 180 TAB | Refills: 1 | Status: SHIPPED | OUTPATIENT
Start: 2019-04-26 | End: 2019-12-03 | Stop reason: SDUPTHER

## 2019-04-26 NOTE — TELEPHONE ENCOUNTER
Requested Prescriptions     Signed Prescriptions Disp Refills    ELIQUIS 5 mg tablet 180 Tab 1     Sig: TAKE 1(ONE) TABLET BY MOUTH 2 TIMES A DAY.      Authorizing Provider: Yesica Blanchard     Ordering User: Glenn Rachel     Verbal order per Dr. Sabra Ahumada.      Future Appointments   Date Time Provider Eliceo Rodas   10/31/2019 10:00 AM Robbi Guthrie  [address]

## 2019-07-02 ENCOUNTER — OFFICE VISIT (OUTPATIENT)
Dept: CARDIOLOGY CLINIC | Age: 79
End: 2019-07-02

## 2019-07-02 DIAGNOSIS — Z95.818 STATUS POST PLACEMENT OF IMPLANTABLE LOOP RECORDER: Primary | ICD-10-CM

## 2019-07-05 ENCOUNTER — DOCUMENTATION ONLY (OUTPATIENT)
Dept: CARDIOLOGY CLINIC | Age: 79
End: 2019-07-05

## 2019-07-05 NOTE — PROGRESS NOTES
ILR showed recurrent PAF RVR to 3 hrs and rate to 200 bpm    Current Outpatient Medications on File Prior to Visit   Medication Sig Dispense Refill    ELIQUIS 5 mg tablet TAKE 1(ONE) TABLET BY MOUTH 2 TIMES A DAY. 180 Tab 1    metoprolol succinate (TOPROL-XL) 50 mg XL tablet TAKE 1 TABLET BY MOUTH ALONG WITH 25MG TABLET FOR A TOTAL OF 75MG DAILY. 30 Tab 11    metoprolol succinate (TOPROL-XL) 25 mg XL tablet TAKE 1 TABLET BY MOUTH ALONG WITH 50MG TABLET FOR A TOTAL OF 75MG DAILY. 30 Tab 11    losartan (COZAAR) 25 mg tablet Take 1 Tab by mouth daily. 30 Tab 11    atorvastatin (LIPITOR) 40 mg tablet Take 1 Tab by mouth nightly. 90 Tab 3    MAGNESIUM GLUCONATE 400 mg by Does Not Apply route daily.  NATURE-THROID 81.25 mg tab Take 1 Tab by mouth nightly.  b complex vitamins tablet Take 1 Tab by mouth daily.  cholecalciferol (VITAMIN D3) 5,000 unit capsule Take 5,000 Units by mouth every Monday, Wednesday, Friday. No current facility-administered medications on file prior to visit.           Future Appointments   Date Time Provider Eliceo Rodas   10/31/2019 10:00 AM Matthew Jackson  [address]

## 2019-08-05 ENCOUNTER — OFFICE VISIT (OUTPATIENT)
Dept: CARDIOLOGY CLINIC | Age: 79
End: 2019-08-05

## 2019-08-05 DIAGNOSIS — Z95.818 STATUS POST PLACEMENT OF IMPLANTABLE LOOP RECORDER: Primary | ICD-10-CM

## 2019-08-07 ENCOUNTER — TELEPHONE (OUTPATIENT)
Dept: CARDIOLOGY CLINIC | Age: 79
End: 2019-08-07

## 2019-08-07 NOTE — TELEPHONE ENCOUNTER
Patient would like to know if it is ok for her to have surgery on her hand (appointment has not been made).     Phone: [phone]

## 2019-08-07 NOTE — TELEPHONE ENCOUNTER
Marquise Tadeo MD  You 2 minutes ago (2:27 PM)      No reason she cannot proceed. Hold eliquis 48 hrs prior. Can send a clearance letter if/when they need one.  thx      Called patient and informed her of message. Pt to have surgeons office call if they need a letter of clearance.  Pt verbalized understanding and denies any further questions at this time

## 2019-08-14 ENCOUNTER — TELEPHONE (OUTPATIENT)
Dept: CARDIOLOGY CLINIC | Age: 79
End: 2019-08-14

## 2019-08-14 NOTE — TELEPHONE ENCOUNTER
Patient states hand surgery is with Dr. Jamel Murillo ([phone]) tomorrow at [facility]. She did not have fax number. I called Dr. Louis Sarmiento office and they provided me with a fax number of [phone] for you to send documentation.        Phone: [phone]

## 2019-08-14 NOTE — TELEPHONE ENCOUNTER
Faxed prior communication with Dr. Minnie Awan regarding surgery to Dr. Deidre Gastelum at fax number [phone]. Fax confirmation received. Attached note stating that MD bobo out of office this week. Patient also sees Dr. Romi Howell. Will ask Pete Obregon NP to write official clearance letter. Will fax back tomorrow ASAP.

## 2019-08-15 NOTE — TELEPHONE ENCOUNTER
Ms. Alberto Stevens last saw Dr Sia Green in clinic on 04/25/2019, & she denied complaints at that time. AF with RVR had been noted on ILR on 07/2019, but she is typically well rate controlled on beta blocker, anticoagulated with Eliquis. Eliquis held x 2 days prior to procedure per Dr. Ran Villarreal previous recommendation, to be resumed as soon as cleared to do so by ortho. Ms. Alberto Stevens is low risk for significant cardiac event in relation to upcoming hand surgery.

## 2019-10-29 NOTE — PROGRESS NOTES
[facility] [place]: [facility]  [phone]    History of Present Illness:   Ms. Carolann Galan is a 77 yo F with remote history of tobacco, CVA in 11/2015 with at the time right sided deficits, hypertension, seen for moderate to severe aortic regurgitation on echo, afib on BB for rate control and eliquis for [address]. 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 It was thought at that time that she may have had a stress induced cardiomyopathy, as she had some hypokinesis of the apex. On her echocardiogram in 12/2015, she did not have any wall motion abnormality and had preserved LV function. Since her last visit cardiac wise, she continues to do well. No palpitations, chest pain or shortness of breath. Her loop monitor noted occasional spells of paroxysmal atrial fibrillation, but they did not correlate with symptoms. She was thinking about having surgery on her left hand with Dr. Giovanny Quintanilla in August, but ended up deciding to hold off. She is also thinking within the next couple of months about whether to have surgery on her right hip with Dr. Mery Scruggs, but has been putting this off. Overall, she says her hip feels fine, so she is not sure if she is going to proceed, but she has been thinking about this for about a year. She has been compliant with her medications. No bleeding issues on Eliquis. She is compensated on exam with clear lungs and no lower extremity edema. Blood pressure was 150/110. On recheck, it was 125/75 consistent with white coat hypertension. On exam, she does have a I/VI systolic murmur at the left upper sternal border. Soc hx. Tob quit 25 yo.  [place] [facility]. Assessment and Plan:    1. Aortic regurgitation. Mild to moderate in severity and by echocardiogram in April and unchanged compared to prior. Will have her follow back in six months. Consider repeat echocardiogram in one year. 2. Paroxysmal atrial fibrillation. Stable on Metoprolol for rate control. 3. Chronic anticoagulation. No bleeding issues on Eliquis. 4. Essential hypertension. Consistent with white coat hypertension. On recheck, blood pressure was normal.   5. Remote tobacco use. 6. History of CVA in 2015. She  has a past medical history of Factor V Leiden mutation [facility]), Hypertension, Hypothyroid, Migraine, Stroke ([facility] (6/07), and [facility] ([facility] ([facility]). All other systems negative except as above. PE  Vitals:    10/30/19 0843   BP: (!) 150/110   Pulse: 70   Resp: 16   SpO2: 96%   Weight: 110 lb 12.8 oz (50.3 kg)   Height: 5' 4\" (1.626 m)    Body mass index is 19.02 kg/m².    General appearance - alert, well appearing, and in no distress  Mental status - affect appropriate to mood  Eyes - sclera anicteric, moist mucous membranes  Neck - supple, no JVD  Chest - clear to auscultation, no wheezes, rales or rhonchi  Heart - normal rate, regular rhythm, normal S1, S2, I-II/VI systolic murmur LUSB  Abdomen - soft, nontender, nondistended, no masses or organomegaly  Extremities - peripheral pulses normal, no pedal edema      Recent Labs:  Lab Results   Component Value Date/Time    Cholesterol, total 172 08/28/2016 09:23 AM    HDL Cholesterol 60 08/28/2016 09:23 AM    LDL cholesterol See Note 01/14/2010 08:37 AM    LDL, calculated 92.6 08/28/2016 09:23 AM    Triglyceride 97 08/28/2016 09:23 AM    CHOL/HDL Ratio 2.9 08/28/2016 09:23 AM     Lab Results   Component Value Date/Time    Creatinine (POC) 0.5 (L) 08/28/2016 08:47 AM    Creatinine 0.81 04/15/2018 08:52 PM     Lab Results   Component Value Date/Time    BUN 18 04/15/2018 08:52 PM    BUN (POC) 13 08/28/2016 08:47 AM     Lab Results   Component Value Date/Time    Potassium 4.0 04/15/2018 08:52 PM     Lab Results   Component Value Date/Time    Hemoglobin A1c 5.2 08/28/2016 09:28 AM     Lab Results   Component Value Date/Time    Hemoglobin (POC) 13.3 2016 08:47 AM    HGB 14.2 04/15/2018 08:52 PM     Lab Results   Component Value Date/Time    PLATELET 587  08:52 PM       Reviewed:  Past Medical History:   Diagnosis Date    [facility] Leiden mutation ([facility]     Hypertension     Hypothyroid     Migraine     Stroke ([facility]     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    Uterine cancer ([facility]      Social History     Tobacco Use   Smoking Status Former Smoker    Packs/day: 25.00    Types: Cigarettes    Last attempt to quit: 1985    Years since quittin.8   Smokeless Tobacco Never Used     Social History     Substance and Sexual Activity   Alcohol Use Yes    Alcohol/week: 8.0 standard drinks    Types: 8 Standard drinks or equivalent per week    Comment: occasional     Allergies   Allergen Reactions    Ampicillin Hives    Celebrex [Celecoxib] Hives    Diltiazem Unknown (comments)       Current Outpatient Medications   Medication Sig    ELIQUIS 5 mg tablet TAKE 1(ONE) TABLET BY MOUTH 2 TIMES A DAY.  metoprolol succinate (TOPROL-XL) 50 mg XL tablet TAKE 1 TABLET BY MOUTH ALONG WITH 25MG TABLET FOR A TOTAL OF 75MG DAILY.  metoprolol succinate (TOPROL-XL) 25 mg XL tablet TAKE 1 TABLET BY MOUTH ALONG WITH 50MG TABLET FOR A TOTAL OF 75MG DAILY.  losartan (COZAAR) 25 mg tablet Take 1 Tab by mouth daily.  atorvastatin (LIPITOR) 40 mg tablet Take 1 Tab by mouth nightly.  MAGNESIUM GLUCONATE 400 mg by Does Not Apply route daily.  NATURE-THROID 81.25 mg tab Take 1 Tab by mouth nightly.  cholecalciferol (VITAMIN D3) 5,000 unit capsule Take 5,000 Units by mouth every Monday, Wednesday, Friday.  b complex vitamins tablet Take 1 Tab by mouth daily. No current facility-administered medications for this visit.         Sanaz Heart MD  [facility] [employer]  [address] [facility] [address] [place] [address]

## 2019-10-30 ENCOUNTER — OFFICE VISIT (OUTPATIENT)
Dept: CARDIOLOGY CLINIC | Age: 79
End: 2019-10-30

## 2019-10-30 VITALS
HEIGHT: 64 IN | BODY MASS INDEX: 18.92 KG/M2 | DIASTOLIC BLOOD PRESSURE: 110 MMHG | WEIGHT: 110.8 LBS | RESPIRATION RATE: 16 BRPM | HEART RATE: 70 BPM | OXYGEN SATURATION: 96 % | SYSTOLIC BLOOD PRESSURE: 150 MMHG

## 2019-10-30 DIAGNOSIS — I35.1 AORTIC VALVE INSUFFICIENCY, ETIOLOGY OF CARDIAC VALVE DISEASE UNSPECIFIED: Primary | ICD-10-CM

## 2019-10-30 DIAGNOSIS — Z87.891 HISTORY OF TOBACCO USE: ICD-10-CM

## 2019-10-30 DIAGNOSIS — I48.0 PAROXYSMAL A-FIB (HCC): ICD-10-CM

## 2019-10-30 DIAGNOSIS — Z79.01 CHRONIC ANTICOAGULATION: ICD-10-CM

## 2019-10-30 DIAGNOSIS — I10 BENIGN ESSENTIAL HTN: ICD-10-CM

## 2019-10-30 NOTE — LETTER
10/30/2019 11:27 PM 
 
Patient:  Rasta Rush YOB: 1940 Date of Visit: 10/30/2019 Dear Monica Pride MD 
[facility] [id number] VIA Facsimile: [phone] 
 : 
 Ms. Carroll Velasco is a 79 yo F with remote history of tobacco, CVA in 11/2015 with at the time right sided deficits, hypertension, seen for moderate to severe aortic regurgitation on echo, afib on BB for rate control and eliquis for [facility]. 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 It was thought at that time that she may have had a stress induced cardiomyopathy, as she had some hypokinesis of the apex. On her echocardiogram in 12/2015, she did not have any wall motion abnormality and had preserved LV function. Since her last visit cardiac wise, she continues to do well. No palpitations, chest pain or shortness of breath. Her loop monitor noted occasional spells of paroxysmal atrial fibrillation, but they did not correlate with symptoms. She was thinking about having surgery on her left hand with Dr. Hipolito Zhao in August, but ended up deciding to hold off. She is also thinking within the next couple of months about whether to have surgery on her right hip with Dr. Myesha Meyer, but has been putting this off. Overall, she says her hip feels fine, so she is not sure if she is going to proceed, but she has been thinking about this for about a year. She has been compliant with her medications. No bleeding issues on Eliquis. She is compensated on exam with clear lungs and no lower extremity edema. Blood pressure was 150/110. On recheck, it was 125/75 consistent with white coat hypertension. On exam, she does have a I/VI systolic murmur at the left upper sternal border. Soc hx. Tob quit 25 yo.  [place] [facility]. Assessment and Plan: 1. Aortic regurgitation.   Mild to moderate in severity and by echocardiogram in April and unchanged compared to prior. Will have her follow back in six months. Consider repeat echocardiogram in one year. 2. Paroxysmal atrial fibrillation. Stable on Metoprolol for rate control. 3. Chronic anticoagulation. No bleeding issues on Eliquis. 4. Essential hypertension. Consistent with white coat hypertension. On recheck, blood pressure was normal.  
5. Remote tobacco use. 6. History of CVA in 2015. If you have questions, please do not hesitate to call me. Sincerely, Kendra Castano MD

## 2019-12-03 RX ORDER — APIXABAN 5 MG/1
TABLET, FILM COATED ORAL
Qty: 180 TAB | Refills: 1 | Status: SHIPPED | OUTPATIENT
Start: 2019-12-03 | End: 2020-08-27

## 2019-12-03 NOTE — TELEPHONE ENCOUNTER
Requested Prescriptions     Signed Prescriptions Disp Refills    ELIQUIS 5 mg tablet 180 Tab 1     Sig: TAKE ONE (1) TABLET BY MOUTH 2 TIMES DAILY.      Authorizing Provider: Juan Mitchell     Ordering User: Megan Ramirez       Last office visit 10/30/19    Per Dr. Nicholas Gresham   Date Time Provider Eliceo Rodas   4/29/2020  9:20 AM Estefany Gutierrez  [address]

## 2019-12-19 ENCOUNTER — TELEPHONE (OUTPATIENT)
Dept: CARDIOLOGY CLINIC | Age: 79
End: 2019-12-19

## 2019-12-19 ENCOUNTER — OFFICE VISIT (OUTPATIENT)
Dept: CARDIOLOGY CLINIC | Age: 79
End: 2019-12-19

## 2019-12-19 DIAGNOSIS — Z95.818 STATUS POST PLACEMENT OF IMPLANTABLE LOOP RECORDER: Primary | ICD-10-CM

## 2019-12-19 NOTE — TELEPHONE ENCOUNTER
LVM for pt to please send a manual transmission from her [facility] box in order for me to review the additional episodes that are being stored.

## 2020-01-23 ENCOUNTER — OFFICE VISIT (OUTPATIENT)
Dept: CARDIOLOGY CLINIC | Age: 80
End: 2020-01-23

## 2020-01-23 ENCOUNTER — TELEPHONE (OUTPATIENT)
Dept: CARDIOLOGY CLINIC | Age: 80
End: 2020-01-23

## 2020-01-23 DIAGNOSIS — Z95.818 STATUS POST PLACEMENT OF IMPLANTABLE LOOP RECORDER: Primary | ICD-10-CM

## 2020-01-23 NOTE — TELEPHONE ENCOUNTER
LVM for patient to send manual transmission. Her ILR has additional episodes that need to be reviewed. Told her to call back if she has any questions.

## 2020-01-27 RX ORDER — METOPROLOL SUCCINATE 50 MG/1
TABLET, EXTENDED RELEASE ORAL
Qty: 30 TAB | Refills: 5 | Status: SHIPPED | OUTPATIENT
Start: 2020-01-27

## 2020-01-27 RX ORDER — METOPROLOL SUCCINATE 25 MG/1
TABLET, EXTENDED RELEASE ORAL
Qty: 30 TAB | Refills: 5 | Status: SHIPPED | OUTPATIENT
Start: 2020-01-27 | End: 2020-10-27

## 2020-01-27 NOTE — TELEPHONE ENCOUNTER
Requested Prescriptions     Signed Prescriptions Disp Refills    metoprolol succinate (TOPROL-XL) 50 mg XL tablet 30 Tab 5     Sig: TAKE ONE (1) TABLET BY MOUTH DAILY WITH 25MG TABLET (TOTAL DOSE = 75MG). Authorizing Provider: Phillip Huddleston     Ordering User: Unique Powell metoprolol succinate (TOPROL-XL) 25 mg XL tablet 30 Tab 5     Sig: TAKE ONE (1) TABLET BY MOUTH DAILY WITH 50MG TABLET (TOTAL DOSE=75MG).      Authorizing Provider: Phillip Huddleston     Ordering User: Layla Locke       Last office visit 10/30/19    Per Dr. Gonzales Covert   Date Time Provider [facility]   4/29/2020  9:20 AM Kimberly Lion  [address]

## 2020-02-25 ENCOUNTER — TELEPHONE (OUTPATIENT)
Dept: CARDIOLOGY CLINIC | Age: 80
End: 2020-02-25

## 2020-03-24 ENCOUNTER — OFFICE VISIT (OUTPATIENT)
Dept: CARDIOLOGY CLINIC | Age: 80
End: 2020-03-24

## 2020-03-24 ENCOUNTER — TELEPHONE (OUTPATIENT)
Dept: CARDIOLOGY CLINIC | Age: 80
End: 2020-03-24

## 2020-03-24 DIAGNOSIS — Z95.818 STATUS POST PLACEMENT OF IMPLANTABLE LOOP RECORDER: Primary | ICD-10-CM

## 2020-03-30 ENCOUNTER — DOCUMENTATION ONLY (OUTPATIENT)
Dept: CARDIOLOGY CLINIC | Age: 80
End: 2020-03-30

## 2020-03-30 NOTE — PROGRESS NOTES
Recurrent PAF RVR on ILR  On eliquis  Future Appointments   Date Time Provider Eliceo Rodas   4/29/2020  9:20 AM Verena Napier  [address]

## 2020-04-27 ENCOUNTER — TELEPHONE (OUTPATIENT)
Dept: CARDIOLOGY CLINIC | Age: 80
End: 2020-04-27

## 2020-04-27 NOTE — TELEPHONE ENCOUNTER
TONIEM for pt to manually send a remote transmission from her ILR. Told her to please call me back here at the office if she has any questions and I can walk her through how to do it.

## 2020-04-28 NOTE — PROGRESS NOTES
TELEPHONE VISIT DOCUMENTATION     Pursuant to the emergency declaration under the [facility] waiver authority and the [employer] Act, this Telephone Visit was conducted, with patient's consent, to reduce the patient's risk of exposure to COVID-19 and provide continuity of care for an established patient. She and/or health care decision maker is aware that that she may receive a bill for this telephone service, depending on her insurance coverage, and has provided verbal consent to proceed. This is a Patient Initiated Episode with an Established Patient who has not had a related appointment within my department in the past 7 days or scheduled within the next 24 hours. HISTORY OF PRESENTING ILLNESS      Raheel Roger is a 78 y.o. female evaluated via telephone on 4/29/2020 due to COVID 19 restrictions. Patient unable to participate in Virtual Visit with synchronous audio/visual technology. Ms. Lennox Mars is a 77 yo F with remote history of tobacco, CVA in 11/2015 with at the time right sided deficits, hypertension, seen for moderate to severe aortic regurgitation on echo, afib on BB for rate control and eliquis for [address]. 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 It was thought at that time that she may have had a stress induced cardiomyopathy, as she had some hypokinesis of the apex. On her echocardiogram in 12/2015, she did not have any wall motion abnormality and had preserved LV function. Since her last visit, overall she is doing okay. With the COVID virus, she mostly has just been very restricted with activity. She is at [facility] and while she can go for a walk, she has otherwise been confined to her room. Overall, she denies any exertional chest pain. Her breathing has been stable.   She denies any palpitations. She has been compliant with her medications. No bleeding issues on Eliquis. Her blood pressure has been okay in the past, but she thinks when she saw her primary care physician it was on the lower side. She is going to check her blood pressure daily for the next week and let us know. Soc hx. Tob quit 29 yo.  [place] [facility]. Assessment and Plan:    1. Aortic regurgitation. Has been mild to moderate in the past and will have her follow up with a same day echocardiogram in six months. 2. Paroxysmal atrial fibrillation. Stable on Metoprolol for rate control. 3. Chronic anticoagulation. No bleeding issues on Eliquis. 4. Essential hypertension. She is going to check her blood pressures as noted above. She does have a history of white coat hypertension. 5. Remote tobacco use. 6. History of CVA in 2015. ASSESSMENT/PLAN:      We discussed the expected course, resolution and complications of the diagnosis(es) in detail. Medication risks, benefits, costs, interactions, and alternatives were discussed as indicated. I advised her to contact the office if her condition worsens, changes or fails to improve as anticipated.  She expressed understanding with the diagnosis(es) and plan       ACTIVE PROBLEM LIST     Patient Active Problem List    Diagnosis Date Noted    Paroxysmal A-fib ([facility] 04/18/2018    Chronic anticoagulation 04/18/2018    Aortic valve regurgitation 04/18/2018    Moderate aortic regurgitation 07/25/2017    Benign essential HTN 07/25/2017    History of tobacco use 07/25/2017    History of stroke 07/25/2017    Advance directive discussed with patient 06/16/2017    Status post placement of implantable loop recorder 11/11/2016    Stroke ([facility] 08/28/2016    IPMN (intraductal papillary mucinous neoplasm) 09/02/2015    Spinal stenosis in cervical region 06/26/2014    Chronic constipation 03/06/2014    Hypothyroid 2012           PAST MEDICAL HISTORY     Past Medical History:   Diagnosis Date    [facility] V Leiden mutation ([facility]     Hypertension     Hypothyroid     Migraine     Stroke ([facility]     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    Uterine cancer ([facility]            PAST SURGICAL HISTORY     Past Surgical History:   Procedure Laterality Date    BREAST SURGERY PROCEDURE UNLISTED  , 3/01    L lumpectomy - atypical cells    HX CATARACT REMOVAL  10/09    right    HX HENNY AND BSO      for uterine cancer          ALLERGIES     Allergies   Allergen Reactions    Ampicillin Hives    Celebrex [Celecoxib] Hives    Diltiazem Unknown (comments)          FAMILY HISTORY     Family History   Problem Relation Age of Onset    Dementia Mother 62    Cancer Father         colon    Diabetes Father         type I    Stroke Sister     negative for cardiac disease       SOCIAL HISTORY     Social History     Socioeconomic History    Marital status:      Spouse name: Not on file    Number of children: Not on file    Years of education: Not on file    Highest education level: Not on file   Tobacco Use    Smoking status: Former Smoker     Packs/day: 25.00     Types: Cigarettes     Last attempt to quit: 1985     Years since quittin.3    Smokeless tobacco: Never Used   Substance and Sexual Activity    Alcohol use: Yes     Alcohol/week: 8.0 standard drinks     Types: 8 Standard drinks or equivalent per week     Comment: occasional    Drug use: No    Sexual activity: Never         MEDICATIONS     Current Outpatient Medications   Medication Sig    metoprolol succinate (TOPROL-XL) 50 mg XL tablet TAKE ONE (1) TABLET BY MOUTH DAILY WITH 25MG TABLET (TOTAL DOSE = 75MG).  metoprolol succinate (TOPROL-XL) 25 mg XL tablet TAKE ONE (1) TABLET BY MOUTH DAILY WITH 50MG TABLET (TOTAL DOSE=75MG).  ELIQUIS 5 mg tablet TAKE ONE (1) TABLET BY MOUTH 2 TIMES DAILY.     losartan (COZAAR) 25 mg tablet Take 1 Tab by mouth daily.    atorvastatin (LIPITOR) 40 mg tablet Take 1 Tab by mouth nightly.  MAGNESIUM GLUCONATE 400 mg by Does Not Apply route daily.  NATURE-THROID 81.25 mg tab Take 1 Tab by mouth nightly.  b complex vitamins tablet Take 1 Tab by mouth daily.  cholecalciferol (VITAMIN D3) 5,000 unit capsule Take 5,000 Units by mouth every Monday, Wednesday, Friday. No current facility-administered medications for this visit. I have reviewed the nurses notes, vitals, problem list, allergy list, medical history, family, social history and medications. REVIEW OF SYMPTOMS     Constitutional: Negative for fever, chills, malaise/fatigue and diaphoresis. Respiratory: Negative for cough, hemoptysis, sputum production, shortness of breath and wheezing. Cardiovascular: Negative for chest pain, palpitations, orthopnea, claudication, leg swelling and PND. Gastrointestinal: Negative for heartburn, nausea, vomiting, blood in stool and melena. Genitourinary: Negative for dysuria and flank pain. Musculoskeletal: Negative for joint pain and back pain. Skin: Negative for rash. Neurological: Negative for focal weakness, seizures, loss of consciousness, weakness and headaches. Endo/Heme/Allergies: Negative for abnormal bleeding. Psychiatric/Behavioral: Negative for memory loss. DIAGNOSTIC DATA      No specialty comments available.        LABORATORY DATA      Lab Results   Component Value Date/Time    WBC 15.8 (H) 04/15/2018 08:52 PM    Hemoglobin (POC) 13.3 08/28/2016 08:47 AM    HGB 14.2 04/15/2018 08:52 PM    Hematocrit (POC) 39 08/28/2016 08:47 AM    HCT 42.0 04/15/2018 08:52 PM    PLATELET 600 03/98/5110 08:52 PM    MCV 89.7 04/15/2018 08:52 PM      Lab Results   Component Value Date/Time    Sodium 139 04/15/2018 08:52 PM    Potassium 4.0 04/15/2018 08:52 PM    Chloride 106 04/15/2018 08:52 PM    CO2 25 04/15/2018 08:52 PM    Anion gap 8 04/15/2018 08:52 PM    Glucose 108 (H) 04/15/2018 08:52 PM BUN 18 04/15/2018 08:52 PM    Creatinine 0.81 04/15/2018 08:52 PM    BUN/Creatinine ratio 22 (H) 04/15/2018 08:52 PM    GFR est AA >60 04/15/2018 08:52 PM    GFR est non-AA >60 04/15/2018 08:52 PM    Calcium 9.7 04/15/2018 08:52 PM    Bilirubin, total 0.6 04/15/2018 08:52 PM    AST (SGOT) 36 04/15/2018 08:52 PM    Alk.  phosphatase 75 04/15/2018 08:52 PM    Protein, total 8.0 04/15/2018 08:52 PM    Albumin 4.4 04/15/2018 08:52 PM    Globulin 3.6 04/15/2018 08:52 PM    A-G Ratio 1.2 04/15/2018 08:52 PM    ALT (SGPT) 57 04/15/2018 08:52 PM            Total Time: 15 minutes      Ollie Anderson MD    [address]  [address] [facility],[address] [address],[address]  [address] [facility]  [phone] / [phone] Fax

## 2020-04-29 ENCOUNTER — VIRTUAL VISIT (OUTPATIENT)
Dept: CARDIOLOGY CLINIC | Age: 80
End: 2020-04-29

## 2020-04-29 ENCOUNTER — TELEPHONE (OUTPATIENT)
Dept: CARDIOLOGY CLINIC | Age: 80
End: 2020-04-29

## 2020-04-29 VITALS — DIASTOLIC BLOOD PRESSURE: 40 MMHG | SYSTOLIC BLOOD PRESSURE: 86 MMHG

## 2020-04-29 DIAGNOSIS — I35.1 AORTIC VALVE INSUFFICIENCY, ETIOLOGY OF CARDIAC VALVE DISEASE UNSPECIFIED: Primary | ICD-10-CM

## 2020-04-29 DIAGNOSIS — Z79.01 CHRONIC ANTICOAGULATION: ICD-10-CM

## 2020-04-29 DIAGNOSIS — I10 BENIGN ESSENTIAL HTN: ICD-10-CM

## 2020-04-29 DIAGNOSIS — I48.0 PAROXYSMAL A-FIB (HCC): ICD-10-CM

## 2020-04-29 DIAGNOSIS — Z87.891 HISTORY OF TOBACCO USE: ICD-10-CM

## 2020-04-29 RX ORDER — AA/PROT/LYSINE/METHIO/VIT C/B6 50-12.5 MG
TABLET ORAL
COMMUNITY

## 2020-04-29 NOTE — LETTER
4/30/2020 6:03 PM 
 
Patient:  Chuy Adams YOB: 1940 Date of Visit: 4/29/2020 Dear Kali Estes MD 
[facility] [address] VIA Facsimile: [phone]: Ms. Rboerto Olson is a 79 yo F with remote history of tobacco, CVA in 11/2015 with at the time right sided deficits, hypertension, seen for moderate to severe aortic regurgitation on echo, afib on BB for rate control and eliquis for [address] [place] [address] Road. 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 It was thought at that time that she may have had a stress induced cardiomyopathy, as she had some hypokinesis of the apex. On her echocardiogram in 12/2015, she did not have any wall motion abnormality and had preserved LV function. Since her last visit, overall she is doing okay. With the COVID virus, she mostly has just been very restricted with activity. She is at [facility] and while she can go for a walk, she has otherwise been confined to her room. Overall, she denies any exertional chest pain. Her breathing has been stable. She denies any palpitations. She has been compliant with her medications. No bleeding issues on Eliquis. Her blood pressure has been okay in the past, but she thinks when she saw her primary care physician it was on the lower side. She is going to check her blood pressure daily for the next week and let us know. Soc hx. Tob quit 27 yo.  [facility]. Assessment and Plan: 1. Aortic regurgitation. Has been mild to moderate in the past and will have her follow up with a same day echocardiogram in six months. 2. Paroxysmal atrial fibrillation. Stable on Metoprolol for rate control. 3. Chronic anticoagulation. No bleeding issues on Eliquis. 4. Essential hypertension.   She is going to check her blood pressures as noted above. She does have a history of white coat hypertension. 5. Remote tobacco use. 6. History of CVA in 2015. If you have questions, please do not hesitate to call me. Sincerely, Lupillo Qureshi MD

## 2020-04-29 NOTE — TELEPHONE ENCOUNTER
Called patient to get scheduled for follow up. Lost connect and unable to reach patient back. Please schedule if calls back.

## 2020-04-29 NOTE — TELEPHONE ENCOUNTER
Future Appointments   Date Time Provider Eliceo Rodas   10/30/2020  9:00 AM ECHO, [address] [facility]   10/30/2020 10:00 AM Lino Boudreaux  [address]

## 2020-04-29 NOTE — TELEPHONE ENCOUNTER
----- Message from Lg Quintanilla MD sent at 4/29/2020  9:18 AM EDT -----  Same day follow up and echo in 6 months for aortic regurgitation.  thx

## 2020-08-27 RX ORDER — APIXABAN 5 MG/1
TABLET, FILM COATED ORAL
Qty: 180 TAB | Refills: 1 | Status: SHIPPED | OUTPATIENT
Start: 2020-08-27

## 2020-08-27 NOTE — TELEPHONE ENCOUNTER
Requested Prescriptions     Signed Prescriptions Disp Refills    Eliquis 5 mg tablet 180 Tab 1     Sig: TAKE ONE (1) TABLET BY MOUTH 2 TIMES DAILY.      Authorizing Provider: Silver Maria     Ordering User: Zheng Frank       Last office visit 4/29/2020    Per Dr. Lul Hdz   Date Time Provider Eliceo Rodas   10/30/2020  9:00 AM [facility]   10/30/2020 10:00 AM MD ROSARIO Tran AMB

## 2020-10-27 RX ORDER — METOPROLOL SUCCINATE 25 MG/1
TABLET, EXTENDED RELEASE ORAL
Qty: 30 TAB | Refills: 6 | Status: SHIPPED | OUTPATIENT
Start: 2020-10-27 | End: 2021-05-28

## 2020-10-27 NOTE — TELEPHONE ENCOUNTER
Requested Prescriptions     Signed Prescriptions Disp Refills    metoprolol succinate (TOPROL-XL) 25 mg XL tablet 30 Tab 6     Sig: TAKE ONE (1) TABLET BY MOUTH DAILY WITH 50MG TABLET (TOTAL DOSE=75MG).      Authorizing Provider: Rosita Rudd     Ordering User: Cassy Wilder       Last office visit 4/29/2020    Per Dr. Harley Caballero   Date Time Provider Eliceo Rodas   10/30/2020  9:00 AM [facility]   10/30/2020 10:00 AM MD ROSARIO Rivas AMB

## 2020-10-30 ENCOUNTER — ANCILLARY PROCEDURE (OUTPATIENT)
Dept: CARDIOLOGY CLINIC | Age: 80
End: 2020-10-30
Payer: MEDICARE

## 2020-10-30 ENCOUNTER — OFFICE VISIT (OUTPATIENT)
Dept: CARDIOLOGY CLINIC | Age: 80
End: 2020-10-30
Payer: MEDICARE

## 2020-10-30 VITALS
HEIGHT: 64 IN | BODY MASS INDEX: 18.78 KG/M2 | WEIGHT: 110 LBS | DIASTOLIC BLOOD PRESSURE: 40 MMHG | SYSTOLIC BLOOD PRESSURE: 90 MMHG

## 2020-10-30 VITALS
BODY MASS INDEX: 18.78 KG/M2 | SYSTOLIC BLOOD PRESSURE: 126 MMHG | HEART RATE: 80 BPM | WEIGHT: 110 LBS | HEIGHT: 64 IN | DIASTOLIC BLOOD PRESSURE: 90 MMHG | RESPIRATION RATE: 18 BRPM | OXYGEN SATURATION: 99 %

## 2020-10-30 DIAGNOSIS — I71.21 ASCENDING AORTIC ANEURYSM: Primary | ICD-10-CM

## 2020-10-30 DIAGNOSIS — I48.0 PAROXYSMAL A-FIB (HCC): ICD-10-CM

## 2020-10-30 DIAGNOSIS — I10 BENIGN ESSENTIAL HTN: ICD-10-CM

## 2020-10-30 DIAGNOSIS — I35.1 AORTIC VALVE INSUFFICIENCY, ETIOLOGY OF CARDIAC VALVE DISEASE UNSPECIFIED: ICD-10-CM

## 2020-10-30 DIAGNOSIS — Z79.01 CHRONIC ANTICOAGULATION: ICD-10-CM

## 2020-10-30 DIAGNOSIS — Z87.891 HISTORY OF TOBACCO USE: ICD-10-CM

## 2020-10-30 LAB
AV R PG: 138.84 MMHG
ECHO AO ASC DIAM: 4.35 CM
ECHO AO ROOT DIAM: 3.74 CM
ECHO AR MAX VEL PISA: 589.15 CM/S
ECHO AV AREA PEAK VELOCITY: 2.74 CM2
ECHO AV AREA VTI: 3.2 CM2
ECHO AV AREA/BSA PEAK VELOCITY: 1.8 CM2/M2
ECHO AV AREA/BSA VTI: 2.1 CM2/M2
ECHO AV MEAN GRADIENT: 4.84 MMHG
ECHO AV PEAK GRADIENT: 10.18 MMHG
ECHO AV PEAK VELOCITY: 159.5 CM/S
ECHO AV REGURGITANT PHT: 632.08 MS
ECHO AV VTI: 24.84 CM
ECHO IVC PROX: 1.68 CM
ECHO LA AREA 4C: 12.09 CM2
ECHO LA MAJOR AXIS: 3 CM
ECHO LA MINOR AXIS: 1.98 CM
ECHO LA VOL 2C: 44.77 ML (ref 22–52)
ECHO LA VOL 4C: 26.87 ML (ref 22–52)
ECHO LA VOL BP: 45.76 ML (ref 22–52)
ECHO LA VOL/BSA BIPLANE: 30.16 ML/M2 (ref 16–28)
ECHO LA VOLUME INDEX A2C: 29.51 ML/M2 (ref 16–28)
ECHO LA VOLUME INDEX A4C: 17.71 ML/M2 (ref 16–28)
ECHO LV E' LATERAL VELOCITY: 4.04 CM/S
ECHO LV E' SEPTAL VELOCITY: 3.4 CM/S
ECHO LV EDV A2C: 76.03 ML
ECHO LV EDV A4C: 85.31 ML
ECHO LV EDV BP: 81.33 ML (ref 56–104)
ECHO LV EDV INDEX A4C: 56.2 ML/M2
ECHO LV EDV INDEX BP: 53.6 ML/M2
ECHO LV EDV NDEX A2C: 50.1 ML/M2
ECHO LV EJECTION FRACTION A2C: 56 PERCENT
ECHO LV EJECTION FRACTION A4C: 51 PERCENT
ECHO LV EJECTION FRACTION BIPLANE: 53.8 PERCENT (ref 55–100)
ECHO LV ESV A2C: 33.79 ML
ECHO LV ESV A4C: 41.82 ML
ECHO LV ESV BP: 37.59 ML (ref 19–49)
ECHO LV ESV INDEX A2C: 22.3 ML/M2
ECHO LV ESV INDEX A4C: 27.6 ML/M2
ECHO LV ESV INDEX BP: 24.8 ML/M2
ECHO LV INTERNAL DIMENSION DIASTOLIC: 4.05 CM (ref 3.9–5.3)
ECHO LV INTERNAL DIMENSION SYSTOLIC: 2.81 CM
ECHO LV IVSD: 1.15 CM (ref 0.6–0.9)
ECHO LV MASS 2D: 157.4 G (ref 67–162)
ECHO LV MASS INDEX 2D: 103.7 G/M2 (ref 43–95)
ECHO LV POSTERIOR WALL DIASTOLIC: 1.14 CM (ref 0.6–0.9)
ECHO LVOT DIAM: 2.25 CM
ECHO LVOT PEAK GRADIENT: 4.88 MMHG
ECHO LVOT PEAK VELOCITY: 110.13 CM/S
ECHO LVOT SV: 79.5 ML
ECHO LVOT VTI: 20.05 CM
ECHO MV A VELOCITY: 63.17 CM/S
ECHO MV AREA PHT: 5.28 CM2
ECHO MV E DECELERATION TIME (DT): 143.65 MS
ECHO MV E VELOCITY: 30.5 CM/S
ECHO MV E/A RATIO: 0.48
ECHO MV E/E' LATERAL: 7.55
ECHO MV E/E' RATIO (AVERAGED): 8.26
ECHO MV E/E' SEPTAL: 8.97
ECHO MV PRESSURE HALF TIME (PHT): 41.66 MS
ECHO RA MAJOR AXIS: 3.99 CM
ECHO RV INTERNAL DIMENSION: 3.49 CM
ECHO RV TAPSE: 1.76 CM (ref 1.5–2)
LA VOL DISK BP: 42.26 ML (ref 22–52)
LVOT MG: 2.86 MMHG

## 2020-10-30 PROCEDURE — G8427 DOCREV CUR MEDS BY ELIG CLIN: HCPCS | Performed by: INTERNAL MEDICINE

## 2020-10-30 PROCEDURE — G8754 DIAS BP LESS 90: HCPCS | Performed by: INTERNAL MEDICINE

## 2020-10-30 PROCEDURE — 1090F PRES/ABSN URINE INCON ASSESS: CPT | Performed by: INTERNAL MEDICINE

## 2020-10-30 PROCEDURE — 1101F PT FALLS ASSESS-DOCD LE1/YR: CPT | Performed by: INTERNAL MEDICINE

## 2020-10-30 PROCEDURE — G8399 PT W/DXA RESULTS DOCUMENT: HCPCS | Performed by: INTERNAL MEDICINE

## 2020-10-30 PROCEDURE — 93000 ELECTROCARDIOGRAM COMPLETE: CPT | Performed by: INTERNAL MEDICINE

## 2020-10-30 PROCEDURE — 99214 OFFICE O/P EST MOD 30 MIN: CPT | Performed by: INTERNAL MEDICINE

## 2020-10-30 PROCEDURE — 93306 TTE W/DOPPLER COMPLETE: CPT | Performed by: INTERNAL MEDICINE

## 2020-10-30 PROCEDURE — G8420 CALC BMI NORM PARAMETERS: HCPCS | Performed by: INTERNAL MEDICINE

## 2020-10-30 PROCEDURE — G8752 SYS BP LESS 140: HCPCS | Performed by: INTERNAL MEDICINE

## 2020-10-30 PROCEDURE — G8510 SCR DEP NEG, NO PLAN REQD: HCPCS | Performed by: INTERNAL MEDICINE

## 2020-10-30 PROCEDURE — G8536 NO DOC ELDER MAL SCRN: HCPCS | Performed by: INTERNAL MEDICINE

## 2020-10-30 RX ORDER — LANOLIN ALCOHOL/MO/W.PET/CERES
CREAM (GRAM) TOPICAL
COMMUNITY
Start: 2020-08-24

## 2020-10-30 RX ORDER — BETAMETHASONE DIPROPIONATE 0.5 MG/G
CREAM TOPICAL
COMMUNITY
Start: 2020-10-22

## 2020-10-30 NOTE — PROGRESS NOTES
[address]: Milana Case  [phone]    History of Present Illness:    Ms. Cinthia Salinas is a 79 yo F with remote history of tobacco, CVA in 11/2015 with at the time right sided deficits, hypertension, seen for moderate to severe aortic regurgitation on echo, afib on BB for rate control and eliquis for [facility]. 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 It was thought at that time that she may have had a stress induced cardiomyopathy, as she had some hypokinesis of the apex. On her echocardiogram in 12/2015, she did not have any wall motion abnormality and had preserved LV function. Since her last visit, she overall continues to do well. She denies any exertional chest pain. Her breathing has been stable. No significant palpitations, lightheadedness or dizziness. She has been compliant with her medications. No bleeding issues on Eliquis. Her blood pressure is well controlled. Her heart rate is 80. Her EKG is normal sinus rhythm. Her echocardiogram today demonstrated moderate aortic regurgitation. Her aorta today measured at 4.4 cm, which was increased compared to 4 cm last time and we discussed the results. She is compensated on exam, II/VI diastolic murmur LUSB  Soc hx. Tob quit 25 yo. , resides [facility] living  Assessment and Plan:    1. Ascending aortic aneurysm. Her measurement has increased and I do think we ought to get a CT of her chest and abdomen and pelvis for further evaluation of her aorta. I tried to reassure her that it is not an issue at this point, but needs close surveillance. At 5.5 cm, would think about surgical involvement. Will tentatively have her follow back in six months with a repeat echocardiogram.  Will try to minimize radiation exposure by doing serial measurements this way. 2. Aortic regurgitation.   Moderate range of AR; mild to moderate on her previous echocardiogram.   3. Paroxysmal atrial fibrillation. Stable in sinus rhythm on Metoprolol. 4. Chronic anticoagulation. No bleeding issues on Eliquis. 5. Essential hypertension. Blood pressure is controlled. 6. Remote tobacco use. 7. History of CVA in 2015. She  has a past medical history of Factor V Leiden mutation [facility]), Hypertension, Hypothyroid, Migraine, Stroke ([facility] (6/07), and Uterine cancer ([facility] (1994). All other systems negative except as above. PE  Vitals:    10/30/20 0911   BP: (!) 126/90   Pulse: 80   Resp: 18   SpO2: 99%   Weight: 110 lb (49.9 kg)   Height: 5' 4\" (1.626 m)    Body mass index is 18.88 kg/m².    General appearance - alert, well appearing, and in no distress  Mental status - affect appropriate to mood  Eyes - sclera anicteric, moist mucous membranes  Neck - supple, no JVD  Chest - clear to auscultation, no wheezes, rales or rhonchi  Heart - normal rate, regular rhythm, normal S1, S2, I-II/VI systolic murmur LUSB  Abdomen - soft, nontender, nondistended, no masses or organomegaly  Extremities - peripheral pulses normal, no pedal edema      Recent Labs:  Lab Results   Component Value Date/Time    Cholesterol, total 172 08/28/2016 09:23 AM    HDL Cholesterol 60 08/28/2016 09:23 AM    LDL cholesterol See Note 01/14/2010 08:37 AM    LDL, calculated 92.6 08/28/2016 09:23 AM    Triglyceride 97 08/28/2016 09:23 AM    CHOL/HDL Ratio 2.9 08/28/2016 09:23 AM     Lab Results   Component Value Date/Time    Creatinine (POC) 0.5 (L) 08/28/2016 08:47 AM    Creatinine 0.81 04/15/2018 08:52 PM     Lab Results   Component Value Date/Time    BUN 18 04/15/2018 08:52 PM    BUN (POC) 13 08/28/2016 08:47 AM     Lab Results   Component Value Date/Time    Potassium 4.0 04/15/2018 08:52 PM     Lab Results   Component Value Date/Time    Hemoglobin A1c 5.2 08/28/2016 09:28 AM     Lab Results   Component Value Date/Time    Hemoglobin (POC) 13.3 08/28/2016 08:47 AM    HGB 14.2 04/15/2018 08:52 PM     Lab Results   Component Value Date/Time    PLATELET 487  08:52 PM       Reviewed:  Past Medical History:   Diagnosis Date    Factor V Leiden mutation ([facility]     Hypertension     Hypothyroid     Migraine     Stroke ([facility]     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    Uterine cancer ([facility]      Social History     Tobacco Use   Smoking Status Former Smoker    Packs/day: 25.00    Types: Cigarettes    Last attempt to quit: 1985    Years since quittin.8   Smokeless Tobacco Never Used     Social History     Substance and Sexual Activity   Alcohol Use Yes    Alcohol/week: 8.0 standard drinks    Types: 8 Standard drinks or equivalent per week    Comment: occasional     Allergies   Allergen Reactions    Ampicillin Hives    Celebrex [Celecoxib] Hives    Diltiazem Unknown (comments)       Current Outpatient Medications   Medication Sig    betamethasone dipropionate (DIPROSONE) 0.05 % topical cream APPLY EXTERNALLY 2 TIMES A DAY FOR 30 DAYS.  metoprolol succinate (TOPROL-XL) 25 mg XL tablet TAKE ONE (1) TABLET BY MOUTH DAILY WITH 50MG TABLET (TOTAL DOSE=75MG).  Eliquis 5 mg tablet TAKE ONE (1) TABLET BY MOUTH 2 TIMES DAILY.  coenzyme q10 (Co Q-10) 10 mg cap Take  by mouth.  metoprolol succinate (TOPROL-XL) 50 mg XL tablet TAKE ONE (1) TABLET BY MOUTH DAILY WITH 25MG TABLET (TOTAL DOSE = 75MG).  losartan (COZAAR) 25 mg tablet Take 1 Tab by mouth daily.  atorvastatin (LIPITOR) 40 mg tablet Take 1 Tab by mouth nightly.  NATURE-THROID 81.25 mg tab Take 1 Tab by mouth nightly. 90mg    melatonin 3 mg tablet TAKE ONE (1) TABLET BY MOUTH AT BEDTIME FOR INSOMNIA.  MAGNESIUM GLUCONATE 400 mg by Does Not Apply route daily.  cholecalciferol (VITAMIN D3) 5,000 unit capsule Take 5,000 Units by mouth every Monday, Wednesday, Friday. No current facility-administered medications for this visit.         Gideon Dumont MD  [facility] heart and [address] [facility] [address], [address], [address]

## 2020-10-30 NOTE — LETTER
10/30/2020 12:52 PM 
 
Patient:  Matthew Casas YOB: 1940 Date of Visit: 10/30/2020 Dear Teddy Villafana MD 
[address] [address] VIA Facsimile: [phone]: Ms. Alysha Marcum is a 79 yo F with remote history of tobacco, CVA in 11/2015 with at the time right sided deficits, hypertension, seen for moderate to severe aortic regurgitation on echo, afib on BB for rate control and eliquis for [address] 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 It was thought at that time that she may have had a stress induced cardiomyopathy, as she had some hypokinesis of the apex. On her echocardiogram in 12/2015, she did not have any wall motion abnormality and had preserved LV function. Since her last visit, she overall continues to do well. She denies any exertional chest pain. Her breathing has been stable. No significant palpitations, lightheadedness or dizziness. She has been compliant with her medications. No bleeding issues on Eliquis. Her blood pressure is well controlled. Her heart rate is 80. Her EKG is normal sinus rhythm. Her echocardiogram today demonstrated moderate aortic regurgitation. Her aorta today measured at 4.4 cm, which was increased compared to 4 cm last time and we discussed the results. She is compensated on exam, II/VI diastolic murmur LUSB Soc hx. Tob quit 29 yo. , resides [facility] living Assessment and Plan: 1. Ascending aortic aneurysm. Her measurement has increased and I do think we ought to get a CT of her chest and abdomen and pelvis for further evaluation of her aorta. I tried to reassure her that it is not an issue at this point, but needs close surveillance. At 5.5 cm, would think about surgical involvement.  Will tentatively have her follow back in six months with a repeat echocardiogram.  Will try to minimize radiation exposure by doing serial measurements this way. 2. Aortic regurgitation. Moderate range of AR; mild to moderate on her previous echocardiogram.  
3. Paroxysmal atrial fibrillation. Stable in sinus rhythm on Metoprolol. 4. Chronic anticoagulation. No bleeding issues on Eliquis. 5. Essential hypertension. Blood pressure is controlled. 6. Remote tobacco use. 7. History of CVA in 2015. If you have questions, please do not hesitate to call me. Sincerely, Devi Johnson MD

## 2020-11-10 ENCOUNTER — HOSPITAL ENCOUNTER (OUTPATIENT)
Dept: CT IMAGING | Age: 80
Discharge: HOME | End: 2020-11-10
Attending: INTERNAL MEDICINE
Payer: MEDICARE

## 2020-11-10 ENCOUNTER — TELEPHONE (OUTPATIENT)
Dept: CARDIOLOGY CLINIC | Age: 80
End: 2020-11-10

## 2020-11-10 DIAGNOSIS — I71.21 ASCENDING AORTIC ANEURYSM: ICD-10-CM

## 2020-11-10 LAB — CREAT BLD-MCNC: 0.7 MG/DL (ref 0.6–1.3)

## 2020-11-10 PROCEDURE — 74011000258 HC RX REV CODE- 258: Performed by: RADIOLOGY

## 2020-11-10 PROCEDURE — 74011000636 HC RX REV CODE- 636: Performed by: RADIOLOGY

## 2020-11-10 PROCEDURE — 71275 CT ANGIOGRAPHY CHEST: CPT

## 2020-11-10 PROCEDURE — 82565 ASSAY OF CREATININE: CPT

## 2020-11-10 PROCEDURE — 74174 CTA ABD&PLVS W/CONTRAST: CPT

## 2020-11-10 RX ORDER — SODIUM CHLORIDE 0.9 % (FLUSH) 0.9 %
10 SYRINGE (ML) INJECTION
Status: COMPLETED | OUTPATIENT
Start: 2020-11-10 | End: 2020-11-10

## 2020-11-10 RX ADMIN — Medication 10 ML: at 10:52

## 2020-11-10 RX ADMIN — SODIUM CHLORIDE 100 ML: 900 INJECTION, SOLUTION INTRAVENOUS at 10:52

## 2020-11-10 RX ADMIN — IOPAMIDOL 100 ML: 755 INJECTION, SOLUTION INTRAVENOUS at 10:52

## 2020-11-10 NOTE — TELEPHONE ENCOUNTER
----- Message from Caretha Lefort, MD sent at 11/10/2020  3:56 PM EST -----  Please let pt know the CT scan looked good. The ascending aorta was 4.3 cm at its widest dimension (4.4 by echo). The radiologist compared this to a November 2013 study and this was unchanged. That means the 2019 echo aorta measurement of 4 cm was an underestimate. While CT scan is the gold standard, we should continue to follow her aorta by echo to minimize radiation. Bottomline, her aorta hasn't changed since 2013. She can keep her follow up and echo in 4/2021.  thx

## 2021-03-18 ENCOUNTER — TRANSCRIBE ORDER (OUTPATIENT)
Dept: SCHEDULING | Age: 81
End: 2021-03-18

## 2021-03-18 DIAGNOSIS — F03.90 DEMENTIA (HCC): Primary | ICD-10-CM

## 2021-03-29 ENCOUNTER — TELEPHONE (OUTPATIENT)
Dept: NEUROLOGY | Age: 81
End: 2021-03-29

## 2021-03-29 NOTE — TELEPHONE ENCOUNTER
----- Message from Giana Frank sent at 3/29/2021  3:26 PM EDT -----  Regarding: /Telephone  Caller's first and last name and relationship (if not the patient):N/A    Best contact number(s): [phone]    Whose call is being returned: PT     Details to clarify the request: Wants to know what location to go to for MRI

## 2021-03-29 NOTE — TELEPHONE ENCOUNTER
Called and LVM for the patient and requested a call back if necessary.  Patient has an MRI scheduled for 04/05/2021

## 2021-04-05 ENCOUNTER — HOSPITAL ENCOUNTER (OUTPATIENT)
Dept: MRI IMAGING | Age: 81
Discharge: HOME | End: 2021-04-05
Attending: FAMILY MEDICINE
Payer: MEDICARE

## 2021-04-05 DIAGNOSIS — F03.90 DEMENTIA (HCC): ICD-10-CM

## 2021-04-05 PROCEDURE — 70551 MRI BRAIN STEM W/O DYE: CPT

## 2021-04-29 NOTE — PROGRESS NOTES
L[address]: [place]  [id number]    History of Present Illness:    Ms. Contreras Kauffman is a 79 yo F with remote history of tobacco, CVA in 11/2015 with at the time right sided deficits, hypertension, seen for moderate to severe aortic regurgitation on echo, afib on BB for rate control and eliquis for [address] 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 On her echocardiogram in 12/2015, she did not have any wall motion abnormality and had preserved LV function. Echo 11/2020 noted normal LV function with moderate aortic regurgitation, ascending aorta at 4.4 cm. CT later showed the ascending aorta was 4.3 cm at its widest dimension and the radiologist compared this to a November 2013 study and this was unchanged.      Since her last visit, she continues to do well. She denies any chest pain, shortness of breath, palpitations, lightheadedness or dizziness. She has been compliant with her medications. No bleeding issues on Eliquis. Her echocardiogram today was unchanged with moderate aortic regurgitation and her aorta was at 4.4 cm. On her last visit due to increase by echocardiogram, a CT scan was done in November of 2020 that demonstrated aorta at 4.3 cm by its widest dimension and this was overall unchanged compared to a few years prior. Soc hx. Tob quit 25 yo. , resides [facility] living  Assessment and Plan:   1. Ascending aortic aneurysm. Measurement has been 4.4 by echocardiogram and is unchanged since last time and by CT is 4.3 cm. Would continue with surveillance and repeat echocardiogram likely in one year. Will have her follow back in six months. 2. Aortic regurgitation. Remains in the moderate range. 3. Paroxysmal atrial fibrillation. Stable in sinus rhythm on Metoprolol. 4. Chronic anticoagulation. No bleeding issues on Eliquis. 5. Essential hypertension. Blood pressure is controlled. 6. Remote tobacco use. 7. History of CVA in 2015. She  has a past medical history of Factor V Leiden mutation [facility]), Hypertension, Hypothyroid, Migraine, Stroke ([facility] (6/07), and Uterine cancer ([facility] (1994). All other systems negative except as above. PE  Vitals:    04/30/21 0858   BP: 120/70   Pulse: 77   SpO2: 98%   Weight: 109 lb (49.4 kg)   Height: 5' 4\" (1.626 m)    Body mass index is 18.71 kg/m².    General appearance - alert, well appearing, and in no distress  Mental status - affect appropriate to mood  Eyes - sclera anicteric, moist mucous membranes  Neck - supple, no JVD  Chest - clear to auscultation, no wheezes, rales or rhonchi  Heart - normal rate, regular rhythm, normal S1, S2, I-II/VI systolic murmur LUSB  Abdomen - soft, nontender, nondistended, no masses or organomegaly  Extremities - peripheral pulses normal, no pedal edema      Recent Labs:  Lab Results   Component Value Date/Time    Cholesterol, total 172 08/28/2016 09:23 AM    HDL Cholesterol 60 08/28/2016 09:23 AM    LDL cholesterol See Note 01/14/2010 08:37 AM    LDL, calculated 92.6 08/28/2016 09:23 AM    Triglyceride 97 08/28/2016 09:23 AM    CHOL/HDL Ratio 2.9 08/28/2016 09:23 AM     Lab Results   Component Value Date/Time    Creatinine (POC) 0.7 11/10/2020 10:39 AM    Creatinine 0.81 04/15/2018 08:52 PM     Lab Results   Component Value Date/Time    BUN 18 04/15/2018 08:52 PM    BUN (POC) 13 08/28/2016 08:47 AM     Lab Results   Component Value Date/Time    Potassium 4.0 04/15/2018 08:52 PM     Lab Results   Component Value Date/Time    Hemoglobin A1c 5.2 08/28/2016 09:28 AM     Lab Results   Component Value Date/Time    Hemoglobin (POC) 13.3 08/28/2016 08:47 AM    HGB 14.2 04/15/2018 08:52 PM     Lab Results   Component Value Date/Time    PLATELET 905 64/02/3577 08:52 PM       Reviewed:  Past Medical History:   Diagnosis Date    [facility] Leiden mutation ([facility]     Hypertension     Hypothyroid     Migraine     Stroke ([facility]     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    Uterine cancer ([facility]      Social History     Tobacco Use   Smoking Status Former Smoker    Packs/day: 25.00    Types: Cigarettes    Quit date: 1985    Years since quittin.3   Smokeless Tobacco Never Used     Social History     Substance and Sexual Activity   Alcohol Use Yes    Alcohol/week: 8.0 standard drinks    Types: 8 Standard drinks or equivalent per week    Comment: occasional     Allergies   Allergen Reactions    Ampicillin Hives    Celebrex [Celecoxib] Hives    Diltiazem Unknown (comments)       Current Outpatient Medications   Medication Sig    melatonin 3 mg tablet TAKE ONE (1) TABLET BY MOUTH AT BEDTIME FOR INSOMNIA.  metoprolol succinate (TOPROL-XL) 25 mg XL tablet TAKE ONE (1) TABLET BY MOUTH DAILY WITH 50MG TABLET (TOTAL DOSE=75MG).  Eliquis 5 mg tablet TAKE ONE (1) TABLET BY MOUTH 2 TIMES DAILY.  coenzyme q10 (Co Q-10) 10 mg cap Take  by mouth.  metoprolol succinate (TOPROL-XL) 50 mg XL tablet TAKE ONE (1) TABLET BY MOUTH DAILY WITH 25MG TABLET (TOTAL DOSE = 75MG).  losartan (COZAAR) 25 mg tablet Take 1 Tab by mouth daily.  atorvastatin (LIPITOR) 40 mg tablet Take 1 Tab by mouth nightly.  MAGNESIUM GLUCONATE 400 mg by Does Not Apply route daily.  NATURE-THROID 81.25 mg tab Take 1 Tab by mouth nightly. 90mg    cholecalciferol (VITAMIN D3) 5,000 unit capsule Take 5,000 Units by mouth every Monday, Wednesday, Friday.  betamethasone dipropionate (DIPROSONE) 0.05 % topical cream APPLY EXTERNALLY 2 TIMES A DAY FOR 30 DAYS. No current facility-administered medications for this visit.         Rodríguez Matos MD  [facility] heart and Vascular [place]  [address] [facility] [address]  [address]

## 2021-04-30 ENCOUNTER — OFFICE VISIT (OUTPATIENT)
Dept: CARDIOLOGY CLINIC | Age: 81
End: 2021-04-30

## 2021-04-30 ENCOUNTER — ANCILLARY PROCEDURE (OUTPATIENT)
Dept: CARDIOLOGY CLINIC | Age: 81
End: 2021-04-30
Payer: MEDICARE

## 2021-04-30 VITALS
SYSTOLIC BLOOD PRESSURE: 120 MMHG | BODY MASS INDEX: 18.61 KG/M2 | DIASTOLIC BLOOD PRESSURE: 70 MMHG | WEIGHT: 109 LBS | HEIGHT: 64 IN | HEART RATE: 77 BPM | OXYGEN SATURATION: 98 %

## 2021-04-30 VITALS — BODY MASS INDEX: 18.61 KG/M2 | WEIGHT: 109 LBS | HEIGHT: 64 IN

## 2021-04-30 DIAGNOSIS — I71.21 ASCENDING AORTIC ANEURYSM: Primary | ICD-10-CM

## 2021-04-30 DIAGNOSIS — I71.21 ASCENDING AORTIC ANEURYSM: ICD-10-CM

## 2021-04-30 DIAGNOSIS — Z87.891 HISTORY OF TOBACCO USE: ICD-10-CM

## 2021-04-30 DIAGNOSIS — Z79.01 CHRONIC ANTICOAGULATION: ICD-10-CM

## 2021-04-30 DIAGNOSIS — I48.0 PAROXYSMAL A-FIB (HCC): ICD-10-CM

## 2021-04-30 DIAGNOSIS — I35.1 AORTIC VALVE INSUFFICIENCY, ETIOLOGY OF CARDIAC VALVE DISEASE UNSPECIFIED: ICD-10-CM

## 2021-04-30 DIAGNOSIS — I10 BENIGN ESSENTIAL HTN: ICD-10-CM

## 2021-04-30 LAB
AV R PG: 102.59 MMHG
ECHO AO ASC DIAM: 4.35 CM
ECHO AO ROOT DIAM: 3.79 CM
ECHO AR MAX VEL PISA: 506.38 CM/S
ECHO AV AREA PEAK VELOCITY: 2.81 CM2
ECHO AV AREA VTI: 3.09 CM2
ECHO AV AREA/BSA PEAK VELOCITY: 1.9 CM2/M2
ECHO AV AREA/BSA VTI: 2 CM2/M2
ECHO AV MEAN GRADIENT: 4.48 MMHG
ECHO AV PEAK GRADIENT: 9.58 MMHG
ECHO AV PEAK VELOCITY: 154.75 CM/S
ECHO AV REGURGITANT PHT: 782.27 MS
ECHO AV VTI: 25.13 CM
ECHO LA AREA 4C: 14.01 CM2
ECHO LA MAJOR AXIS: 3.48 CM
ECHO LA MINOR AXIS: 2.3 CM
ECHO LA VOL 2C: 45.98 ML (ref 22–52)
ECHO LA VOL 4C: 34.67 ML (ref 22–52)
ECHO LA VOL BP: 48.12 ML (ref 22–52)
ECHO LA VOL/BSA BIPLANE: 31.84 ML/M2 (ref 16–28)
ECHO LA VOLUME INDEX A2C: 30.42 ML/M2 (ref 16–28)
ECHO LA VOLUME INDEX A4C: 22.94 ML/M2 (ref 16–28)
ECHO LV E' LATERAL VELOCITY: 4.19 CM/S
ECHO LV E' SEPTAL VELOCITY: 3.43 CM/S
ECHO LV EDV A2C: 74.43 ML
ECHO LV EDV A4C: 81.46 ML
ECHO LV EDV BP: 82.24 ML (ref 56–104)
ECHO LV EDV INDEX A4C: 53.9 ML/M2
ECHO LV EDV INDEX BP: 54.4 ML/M2
ECHO LV EDV NDEX A2C: 49.3 ML/M2
ECHO LV EJECTION FRACTION A2C: 58 PERCENT
ECHO LV EJECTION FRACTION A4C: 39 PERCENT
ECHO LV EJECTION FRACTION BIPLANE: 52.8 PERCENT (ref 55–100)
ECHO LV ESV A2C: 30.97 ML
ECHO LV ESV A4C: 49.73 ML
ECHO LV ESV BP: 38.8 ML (ref 19–49)
ECHO LV ESV INDEX A2C: 20.5 ML/M2
ECHO LV ESV INDEX A4C: 32.9 ML/M2
ECHO LV ESV INDEX BP: 25.7 ML/M2
ECHO LV INTERNAL DIMENSION DIASTOLIC: 4.79 CM (ref 3.9–5.3)
ECHO LV INTERNAL DIMENSION SYSTOLIC: 3.45 CM
ECHO LV IVSD: 1.31 CM (ref 0.6–0.9)
ECHO LV MASS 2D: 232.8 G (ref 67–162)
ECHO LV MASS INDEX 2D: 154.1 G/M2 (ref 43–95)
ECHO LV POSTERIOR WALL DIASTOLIC: 1.2 CM (ref 0.6–0.9)
ECHO LVOT DIAM: 2.11 CM
ECHO LVOT PEAK GRADIENT: 6.18 MMHG
ECHO LVOT PEAK VELOCITY: 124.26 CM/S
ECHO LVOT SV: 77.6 ML
ECHO LVOT VTI: 22.14 CM
ECHO MV A VELOCITY: 66.34 CM/S
ECHO MV E DECELERATION TIME (DT): 166.37 MS
ECHO MV E VELOCITY: 37.18 CM/S
ECHO MV E/A RATIO: 0.56
ECHO MV E/E' LATERAL: 8.87
ECHO MV E/E' RATIO (AVERAGED): 9.86
ECHO MV E/E' SEPTAL: 10.84
ECHO PV MAX VELOCITY: 72.41 CM/S
ECHO PV PEAK INSTANTANEOUS GRADIENT SYSTOLIC: 2.1 MMHG
ECHO RV INTERNAL DIMENSION: 3.27 CM
ECHO RV TAPSE: 1.82 CM (ref 1.5–2)
ECHO TV REGURGITANT MAX VELOCITY: 248.68 CM/S
ECHO TV REGURGITANT PEAK GRADIENT: 24.74 MMHG
LA VOL DISK BP: 44.19 ML (ref 22–52)

## 2021-04-30 PROCEDURE — G8510 SCR DEP NEG, NO PLAN REQD: HCPCS | Performed by: INTERNAL MEDICINE

## 2021-04-30 PROCEDURE — 93306 TTE W/DOPPLER COMPLETE: CPT | Performed by: INTERNAL MEDICINE

## 2021-04-30 PROCEDURE — G8427 DOCREV CUR MEDS BY ELIG CLIN: HCPCS | Performed by: INTERNAL MEDICINE

## 2021-04-30 PROCEDURE — G8420 CALC BMI NORM PARAMETERS: HCPCS | Performed by: INTERNAL MEDICINE

## 2021-04-30 PROCEDURE — G8754 DIAS BP LESS 90: HCPCS | Performed by: INTERNAL MEDICINE

## 2021-04-30 PROCEDURE — 1101F PT FALLS ASSESS-DOCD LE1/YR: CPT | Performed by: INTERNAL MEDICINE

## 2021-04-30 PROCEDURE — G8752 SYS BP LESS 140: HCPCS | Performed by: INTERNAL MEDICINE

## 2021-04-30 PROCEDURE — G8399 PT W/DXA RESULTS DOCUMENT: HCPCS | Performed by: INTERNAL MEDICINE

## 2021-04-30 PROCEDURE — 99214 OFFICE O/P EST MOD 30 MIN: CPT | Performed by: INTERNAL MEDICINE

## 2021-04-30 PROCEDURE — 1090F PRES/ABSN URINE INCON ASSESS: CPT | Performed by: INTERNAL MEDICINE

## 2021-04-30 PROCEDURE — G8536 NO DOC ELDER MAL SCRN: HCPCS | Performed by: INTERNAL MEDICINE

## 2021-04-30 NOTE — LETTER
Patient:  Bianka Javier YOB: 1940 Date of Visit: 4/30/2021 Dear Lam Scales MD 
[facility] [address] [address] Via Fax: [phone]: Ms. Kim Navarro is a 79 yo F with remote history of tobacco, CVA in 11/2015 with at the time right sided deficits, hypertension, seen for moderate to severe aortic regurgitation on echo, afib on BB for rate control and eliquis for [address] [place] [address] Road. 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 On her echocardiogram in 12/2015, she did not have any wall motion abnormality and had preserved LV function. Echo 11/2020 noted normal LV function with moderate aortic regurgitation, ascending aorta at 4.4 cm. CT later showed the ascending aorta was 4.3 cm at its widest dimension and the radiologist compared this to a November 2013 study and this was unchanged.  
  
Since her last visit, she continues to do well. She denies any chest pain, shortness of breath, palpitations, lightheadedness or dizziness. She has been compliant with her medications. No bleeding issues on Eliquis. Her echocardiogram today was unchanged with moderate aortic regurgitation and her aorta was at 4.4 cm. On her last visit due to increase by echocardiogram, a CT scan was done in November of 2020 that demonstrated aorta at 4.3 cm by its widest dimension and this was overall unchanged compared to a few years prior. Soc hx. Tob quit 27 yo. , resides [facility] living Assessment and Plan: 1. Ascending aortic aneurysm. Measurement has been 4.4 by echocardiogram and is unchanged since last time and by CT is 4.3 cm. Would continue with surveillance and repeat echocardiogram likely in one year. Will have her follow back in six months. 2. Aortic regurgitation. Remains in the moderate range. 3. Paroxysmal atrial fibrillation. Stable in sinus rhythm on Metoprolol. 4. Chronic anticoagulation. No bleeding issues on Eliquis. 5. Essential hypertension. Blood pressure is controlled. 6. Remote tobacco use. 7. History of CVA in 2015. If you have questions, please do not hesitate to call me. Sincerely, Mick Rojas MD

## 2021-05-10 ENCOUNTER — OFFICE VISIT (OUTPATIENT)
Dept: NEUROLOGY | Age: 81
End: 2021-05-10
Payer: MEDICARE

## 2021-05-10 VITALS
HEART RATE: 73 BPM | HEIGHT: 64 IN | BODY MASS INDEX: 18.78 KG/M2 | SYSTOLIC BLOOD PRESSURE: 130 MMHG | OXYGEN SATURATION: 97 % | DIASTOLIC BLOOD PRESSURE: 90 MMHG | WEIGHT: 110 LBS

## 2021-05-10 DIAGNOSIS — I67.82 CHRONIC CEREBRAL ISCHEMIA: ICD-10-CM

## 2021-05-10 DIAGNOSIS — R41.3 MEMORY LOSS: Primary | ICD-10-CM

## 2021-05-10 PROCEDURE — G8536 NO DOC ELDER MAL SCRN: HCPCS | Performed by: PSYCHIATRY & NEUROLOGY

## 2021-05-10 PROCEDURE — 1101F PT FALLS ASSESS-DOCD LE1/YR: CPT | Performed by: PSYCHIATRY & NEUROLOGY

## 2021-05-10 PROCEDURE — G8510 SCR DEP NEG, NO PLAN REQD: HCPCS | Performed by: PSYCHIATRY & NEUROLOGY

## 2021-05-10 PROCEDURE — G8420 CALC BMI NORM PARAMETERS: HCPCS | Performed by: PSYCHIATRY & NEUROLOGY

## 2021-05-10 PROCEDURE — G8755 DIAS BP > OR = 90: HCPCS | Performed by: PSYCHIATRY & NEUROLOGY

## 2021-05-10 PROCEDURE — G8427 DOCREV CUR MEDS BY ELIG CLIN: HCPCS | Performed by: PSYCHIATRY & NEUROLOGY

## 2021-05-10 PROCEDURE — 1090F PRES/ABSN URINE INCON ASSESS: CPT | Performed by: PSYCHIATRY & NEUROLOGY

## 2021-05-10 PROCEDURE — G8399 PT W/DXA RESULTS DOCUMENT: HCPCS | Performed by: PSYCHIATRY & NEUROLOGY

## 2021-05-10 PROCEDURE — 99205 OFFICE O/P NEW HI 60 MIN: CPT | Performed by: PSYCHIATRY & NEUROLOGY

## 2021-05-10 PROCEDURE — G8752 SYS BP LESS 140: HCPCS | Performed by: PSYCHIATRY & NEUROLOGY

## 2021-05-10 RX ORDER — ACETAMINOPHEN 500 MG
TABLET ORAL
COMMUNITY

## 2021-05-10 RX ORDER — DONEPEZIL HYDROCHLORIDE 5 MG/1
5 TABLET, FILM COATED ORAL
Qty: 90 TAB | Refills: 1 | Status: SHIPPED | OUTPATIENT
Start: 2021-05-10

## 2021-05-10 RX ORDER — METHOCARBAMOL 750 MG/1
TABLET, FILM COATED ORAL
COMMUNITY

## 2021-05-10 RX ORDER — MECOBALAMIN 5000 MCG
LOZENGE ORAL
COMMUNITY
Start: 2021-02-03

## 2021-05-10 NOTE — LETTER
5/10/2021 Patient: Shane Sterling YOB: 1940 Date of Visit: 5/10/2021 Carly Valente MD 
[address] [place] [id number] Via Fax: [phone] Dear Carly Valente MD, Thank you for referring Ms. Maya So to [address] [place] [address] for evaluation. My notes for this consultation are attached. If you have questions, please do not hesitate to call me. I look forward to following your patient along with you. Sincerely, Mariaelena Hernandez DO Chief Complaint Patient presents with  Follow-up  Results MRI Referred by:  [facility] GUIDO Juarez is an 55-year-old woman who is a new patient for me. This is my first time evaluating her. Her daughter Carl Montemayor is present. She was referred to me for a recent MRI done out of concerns for memory loss. I spoke with Maya herself. Collateral information from page. It sounds like for the past several months her memory has been getting worse such that she is repeating her questions and conversations daily multiple times. She has difficulty doing complex tasks even after being instructed multiple times. She is over buying at the market repetitive items. She got lost going to a friend's house last year that was familiar to her. She lives alone at [facility]. She is in independent living. Her ADLs reportedly are intact. Possibly eating less because of reduced appetite. Walks daily. No mood changes per page. She is on Eliquis for A. fib. Looking at her neurological history, she had an acute stroke last in 2016 in multifocal arterial distributions due to A. fib which is why she is on blood thinners now. MRI brain was done last month showing chronic ischemic changes but no acute process. Review of Systems Unable to perform ROS: Mental acuity Psychiatric/Behavioral: Positive for memory loss. Past Medical History:  
Diagnosis Date  Factor V Leiden mutation ([facility]  Hypertension  Hypothyroid  Migraine  Stroke [facility])  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  Uterine cancer ([facility]  Family History Problem Relation Age of Onset  Dementia Mother 62  Cancer Father   
     colon  Diabetes Father   
     type I  
 Stroke Sister Social History Socioeconomic History  Marital status:  Spouse name: Not on file  Number of children: Not on file  Years of education: Not on file  Highest education level: Not on file Occupational History  Not on file Social Needs  Financial resource strain: Not on file  Food insecurity Worry: Not on file Inability: Not on file  Transportation needs Medical: Not on file Non-medical: Not on file Tobacco Use  Smoking status: Former Smoker Packs/day: 25.00 Types: Cigarettes Quit date: 1985 Years since quittin.3  Smokeless tobacco: Never Used Substance and Sexual Activity  Alcohol use: Yes Alcohol/week: 8.0 standard drinks Types: 8 Standard drinks or equivalent per week Comment: occasional  
 Drug use: No  
 Sexual activity: Never Lifestyle  Physical activity Days per week: Not on file Minutes per session: Not on file  Stress: Not on file Relationships  Social connections Talks on phone: Not on file Gets together: Not on file Attends Restorationism service: Not on file Active member of club or organization: Not on file Attends meetings of clubs or organizations: Not on file Relationship status: Not on file  Intimate partner violence Fear of current or ex partner: Not on file Emotionally abused: Not on file Physically abused: Not on file Forced sexual activity: Not on file Other Topics Concern  Not on file Social History Narrative  Not on file Current Outpatient Medications Medication Sig  
 mecobalamin, vitamin B12, 5,000 mcg lozg TAKE ONE (1) TABLET BY MOUTH DAILY.  donepeziL (ARICEPT) 5 mg tablet Take 1 Tab by mouth nightly.  melatonin 3 mg tablet TAKE ONE (1) TABLET BY MOUTH AT BEDTIME FOR INSOMNIA.  metoprolol succinate (TOPROL-XL) 25 mg XL tablet TAKE ONE (1) TABLET BY MOUTH DAILY WITH 50MG TABLET (TOTAL DOSE=75MG).  Eliquis 5 mg tablet TAKE ONE (1) TABLET BY MOUTH 2 TIMES DAILY.  metoprolol succinate (TOPROL-XL) 50 mg XL tablet TAKE ONE (1) TABLET BY MOUTH DAILY WITH 25MG TABLET (TOTAL DOSE = 75MG).  atorvastatin (LIPITOR) 40 mg tablet Take 1 Tab by mouth nightly.  NATURE-THROID 81.25 mg tab Take 1 Tab by mouth nightly. 90mg  acetaminophen (TYLENOL) 500 mg tablet Pain Reliever (acetaminophen) 500 mg tablet TAKE 2 TABLETS BY MOUTH THREE TIMES DAILY FOR 2 WEEKS FOR NECK PAIN  
 methocarbamoL (ROBAXIN) 750 mg tablet methocarbamol 750 mg tablet TAKE 1 TABLET BY MOUTH THREE TIMES A DAY FOR 5 DAYS  
 betamethasone dipropionate (DIPROSONE) 0.05 % topical cream APPLY EXTERNALLY 2 TIMES A DAY FOR 30 DAYS.  coenzyme q10 (Co Q-10) 10 mg cap Take  by mouth.  losartan (COZAAR) 25 mg tablet Take 1 Tab by mouth daily.  MAGNESIUM GLUCONATE 400 mg by Does Not Apply route daily.  cholecalciferol (VITAMIN D3) 5,000 unit capsule Take 5,000 Units by mouth every Monday, Wednesday, Friday. No current facility-administered medications for this visit. Allergies Allergen Reactions  Ampicillin Hives  Celebrex [Celecoxib] Hives  Diltiazem Unknown (comments) Neurologic Exam  
 
Mental Status Pleasant elderly woman awake and alert. She can follow commands. She knows the date month and year. She can spell ocean forward but she cannot backwards and gets frustrated and she stops. She can compare in contrast items successfully. Cranial Nerves Cranial nerves II through XII intact. Left facial asymmetry, visual fields intact grossly Motor Exam  
Muscle bulk: decreased Strength Strength 5/5 throughout. Sensory Exam  
Light touch normal.  
 
Gait, Coordination, and Reflexes Gait Gait: normal 
 
Physical Exam  
Constitutional: She appears well-developed and well-nourished. Neurological: She has normal strength. Gait normal.  
Nursing note and vitals reviewed. Visit Vitals BP (!) 130/90 Comment: retaking 130/90 Pulse 73 Ht 5' 4\" (1.626 m) Wt 110 lb (49.9 kg) SpO2 97% BMI 18.88 kg/m² Lab Results Component Value Date/Time WBC 15.8 (H) 04/15/2018 08:52 PM  
 HGB 14.2 04/15/2018 08:52 PM  
 Hemoglobin (POC) 13.3 08/28/2016 08:47 AM  
 HCT 42.0 04/15/2018 08:52 PM  
 Hematocrit (POC) 39 08/28/2016 08:47 AM  
 PLATELET 543 84/56/9637 08:52 PM  
 MCV 89.7 04/15/2018 08:52 PM  
 
Lab Results Component Value Date/Time Hemoglobin A1c 5.2 08/28/2016 09:28 AM  
 Glucose 108 (H) 04/15/2018 08:52 PM  
 Glucose (POC) 85 08/30/2016 08:31 AM  
 LDL cholesterol See Note 01/14/2010 08:37 AM  
 LDL, calculated 92.6 08/28/2016 09:23 AM  
 Creatinine (POC) 0.7 11/10/2020 10:39 AM  
 Creatinine 0.81 04/15/2018 08:52 PM  
  
Lab Results Component Value Date/Time Cholesterol, total 172 08/28/2016 09:23 AM  
 HDL Cholesterol 60 08/28/2016 09:23 AM  
 LDL cholesterol See Note 01/14/2010 08:37 AM  
 LDL, calculated 92.6 08/28/2016 09:23 AM  
 Triglyceride 97 08/28/2016 09:23 AM  
 CHOL/HDL Ratio 2.9 08/28/2016 09:23 AM  
 
Lab Results Component Value Date/Time ALT (SGPT) 57 04/15/2018 08:52 PM  
 Alk. phosphatase 75 04/15/2018 08:52 PM  
 Bilirubin, total 0.6 04/15/2018 08:52 PM  
 Albumin 4.4 04/15/2018 08:52 PM  
 Protein, total 8.0 04/15/2018 08:52 PM  
 INR 1.0 04/15/2018 08:52 PM  
 Prothrombin time 10.5 04/15/2018 08:52 PM  
 PLATELET 820 12/36/8919 08:52 PM  
  
 
CT Results (maximum last 3): Results from Hospital Encounter encounter on 11/10/20 CTA ABDOMEN PELV W CONT Narrative INDICATION:  Ascending aortic aneurysm COMPARISON:  November 2013 TECHNIQUE:   After the rapid bolus administration of 100 cc of Isovue-370, then 
thin section helical images were obtained through the chest, abdomen, and 
pelvis. 3D image postprocessing and maximum intensity projections were 
performed. CT dose reduction was achieved through use of a standardized 
protocol tailored for this examination and automatic exposure control for dose 
modulation. Adaptive statistical iterative reconstruction (ASIR) was utilized. FINDINGS: 
CHEST: Biapical pleural-parenchymal scarring. No mediastinal lymphadenopathy or 
pulmonary consolidation. Heart size is normal. No pleural or pericardial 
effusion. No axillary lymphadenopathy. ABDOMEN: Incidental hepatic cysts. Perfusional changes in segment 6 of the 
liver. Portal vein is patent. No biliary dilation. Spleen is normal. Left 
adrenal nodule measures 25 x 28 mm, stable since 2013. Left upper pole simple 
renal cyst. Within the pancreatic neck there is a 9 mm hypodensity which is 
likely a sidebranch IPMN. Bowel is normal in caliber. No lymphadenopathy or ascites. PELVIS: Sigmoid colonic diverticulosis. No free fluid in the pelvis. Uterus and 
ovaries are not visualized, likely surgically absent. Urinary bladder is normal. 
BONES: Laminectomy and posterior fusion at L4-5. VESSELS: Ascending thoracic aortic aneurysm measuring 43 x 42 mm, stable since November 2013. Descending thoracic aorta is normal in caliber. Abdominal aorta 
demonstrates atherosclerotic plaque without aneurysm. Iliac and femoral arteries 
are patent. Impression IMPRESSION: 
1.  Stable 4.3 x 4.2 cm ascending thoracic aortic aneurysm compared to November 2013. 
2.  Stable chronic findings including hepatic cysts, right renal cyst, and a 
left adrenal nodule. 3.  9 mm pancreatic neck hypodensity, likely a sidebranch IPMN. CTA CHEST W OR W WO CONT Narrative INDICATION:  Ascending aortic aneurysm COMPARISON:  November 2013 TECHNIQUE: After the rapid bolus administration of 100 cc of Isovue-370, then 
thin section helical images were obtained through the chest, abdomen, and 
pelvis. 3D image postprocessing and maximum intensity projections were 
performed. CT dose reduction was achieved through use of a standardized 
protocol tailored for this examination and automatic exposure control for dose 
modulation. Adaptive statistical iterative reconstruction (ASIR) was utilized. FINDINGS: 
CHEST: Biapical pleural-parenchymal scarring. No mediastinal lymphadenopathy or 
pulmonary consolidation. Heart size is normal. No pleural or pericardial 
effusion. No axillary lymphadenopathy. ABDOMEN: Incidental hepatic cysts. Perfusional changes in segment 6 of the 
liver. Portal vein is patent. No biliary dilation. Spleen is normal. Left 
adrenal nodule measures 25 x 28 mm, stable since 2013. Left upper pole simple 
renal cyst. Within the pancreatic neck there is a 9 mm hypodensity which is 
likely a sidebranch IPMN. Bowel is normal in caliber. No lymphadenopathy or ascites. PELVIS: Sigmoid colonic diverticulosis. No free fluid in the pelvis. Uterus and 
ovaries are not visualized, likely surgically absent. Urinary bladder is normal. 
BONES: Laminectomy and posterior fusion at L4-5. VESSELS: Ascending thoracic aortic aneurysm measuring 43 x 42 mm, stable since November 2013. Descending thoracic aorta is normal in caliber. Abdominal aorta 
demonstrates atherosclerotic plaque without aneurysm. Iliac and femoral arteries 
are patent. Impression IMPRESSION: 
1.  Stable 4.3 x 4.2 cm ascending thoracic aortic aneurysm compared to November 2013. 
2.  Stable chronic findings including hepatic cysts, right renal cyst, and a 
left adrenal nodule. 3.  9 mm pancreatic neck hypodensity, likely a sidebranch IPMN. MRI Results (maximum last 3): Results from [facility] Encounter encounter on 04/05/21 MRI BRAIN WO CONT  Narrative EXAM: MRI BRAIN WO CONT INDICATION: Dementia COMPARISON: August 2016. CONTRAST: None. TECHNIQUE:   
Multiplanar multisequence acquisition without contrast of the brain. FINDINGS: 
Diffusion imaging does not show acute ischemic changes. There is no extra-axial fluid collection hemorrhage or shift. There are no 
masses. Flow voids in major vessels at the base of the brain are present. Remote 
left parietal and occipital infarcts. Mild atrophy and nonspecific white matter 
changes. Extent deformity at the C1-C2 junction possibly chronic. Next Impression 1. No acute findings no mass. 2. Atrophy white matter disease and remote left side parietal and occipital 
ischemia. Results from [facility] Encounter encounter on 03/22/18 MRI HIP  RT  WO CONT Narrative EXAM:  MRI HIP  RT  WO CONT INDICATION: Right hip pain and right groin pain for one year without injury. COMPARISON: Pelvis view on 2/2/2009 TECHNIQUE: Coronal T1 and axial T2 fat-saturated MRI of the whole pelvis; axial 
and sagittal T2 fat-saturated; coronal proton density fat-saturated MRI of the 
right hip . CONTRAST: None. FINDINGS: Bone marrow: Degenerative bone marrow edema is moderate in the 
anterior, superior right acetabulum. Metal artifact arises from lumbar spine 
fusion hardware. Fat saturation is heterogeneous in the proximal sacrum. There 
is no acute fracture or osteonecrosis. Joint fluid: Bilateral hip joint fluid is physiologic. Articular cartilage: Heterogeneous high-grade partial-thickness and 
full-thickness loss of the articular cartilage from the anterior weightbearing 
right hip joint. No measurable osteochondral lesion. Acetabular labrum: Age-appropriate nondisplaced degeneration of the right 
acetabular labrum. Hip morphology: Normal concavity of the femoral head-neck junction. No 
acetabular overcoverage or retroversion. Tendons: Mild bilateral hamstring origin tendinosis.  No full-thickness tendon tear.  
 
Muscles: Mild diffuse atrophy. No focal edema. Soft tissue mass: None. Lobulated, septated, heterogeneous cystic lesion 
anterior, medial to the distal iliopsoas tendon measures 3.0 x 2.9 x 5.3 cm and 
appears to arise from the inferior margin of the right hip joint. There is 
anterior displacement of the femoral neurovascular bundle. Intrapelvic soft tissues: No acute process. Diverticulosis of the colon is 
partially imaged. Impression IMPRESSION:  
1. 3.0 x 2.9 x 5.3 cm complicated, septated cyst anterior to the right hip joint 
most likely represents a nonacute joint related ganglion cyst. Venous varix is 
less likely. Pseudoaneurysm from the adjacent femoral artery is unlikely. Prior 
to any intervention, consider ultrasound to evaluate for internal blood flow. 2. Moderate-severe right hip osteoarthritis. Degenerative bone marrow edema in 
the right acetabulum contributes to pain. 3. No fracture. Results from [facility] Encounter encounter on 08/28/16 MRI BRAIN WO CONT Narrative INDICATION: stroke Left-sided headache dizziness. Slurred speech Prior history 
of stroke 2007 and 2005 with right-sided weakness. COMPARISON: CT 8/30/2016 EXAM: Sagittal T1-weighted spin-echo, axial FLAIR and T2-weighted fast 
spin-echo, axial diffusion weighted echo planar, axial T1-weighted spin-echo, 
axial gradient echo, and coronal T2-weighted fast spin-echo MR images of the 
brain are obtained. FINDINGS: There are several areas of restricted diffusion including the 
superomedial left thalamus, several areas in the left occipital lobe, anterior 
in the inferoposterior right occipital lobe and adjacent right temporal lobe, in 
the superior right parietal lobe. There is also a small focus of restricted 
diffusion in the right cerebral hemisphere. The findings, given their multiple 
locations, which suggest an embolic source as causative.  
 
Numerous foci of nonspecific white matter signal alteration demonstrated in the 
cerebrum and in the central zuleima. There is diminished flow void in the left 
vertebral artery at the foramen magnum suggesting thrombus. There is also 
increased signal shown centrally on the T1-weighted images within the central 
basilar artery suggesting slow flow or thrombus. The ventricles and cortical 
sulci are normal in size and contour. Sella, optic chiasm, orbits and paranasal 
sinuses are normal except for mild mucosal thickening in the inferior left 
maxillary sinus. [place] Impression IMPRESSION: Multifocal areas of acute infarction including left thalamus, left 
greater than right occipital lobes bilaterally, and right cerebellar hemisphere. PET Results (maximum last 3): No results found for this or any previous visit. Assessment and Plan Diagnoses and all orders for this visit: 
 
1. Memory loss 
-     REFERRAL TO OCCUPATIONAL THERAPY 
-     PET BRAIN METABOLIC EVAL (INI); Future -     VITAMIN B12 & FOLATE; Future 2. Chronic cerebral ischemia 
-     PET BRAIN METABOLIC EVAL (INI); Future Other orders 
-     donepeziL (ARICEPT) 5 mg tablet; Take 1 Tab by mouth nightly. 25-year-old woman who presents with memory changes. I am concerned she is developing early dementia. The differential being either Alzheimer's versus vascular dementia or combination thereof. She certainly has risk for both. I am going to have her complete a driving evaluation to determine her safety with driving. I do think her days of driving are numbered and I discussed this with her specifically. She should not drive until her evaluation is done and verified as being successful if it is. I am also in to have her try to do a PET scan to help clarify her diagnosis whether or not there is any pattern particular to Alzheimer's or not. Start Aricept now with the intention of optimizing treatment as much as possible and slowing disease progression.   Check a B12 level since her appetite has been suffering. I discussed my concerns with her and her daughter personally. Defer neuropsych testing at this point as it is not going to change our management imminently. At some point we may need to have this done for medical legal purposes if required. She understands the plan and I will see her in follow-up. 60 minutes of time was spent reviewing the medical record today, reviewing the neuroimaging done in April, face-to-face time with the patient and her daughter to include collateral information gathering, completion of documentation. I reviewed and decided to continue the current medications. This clinical note was dictated with an electronic dictation software that can make unintentional errors. If there are any questions, please contact me directly for clarification. A notice of this visit/encounter being completed has been sent electronically to the patient's PCP and/or referring provider. 2 [facility],  
NEUROLOGIST Diplomate LAKEISHA

## 2021-05-10 NOTE — PROGRESS NOTES
Chief Complaint   Patient presents with    Follow-up    Results     MRI       Referred by: Dr. Tonya Farley is an 78-year-old woman who is a new patient for me. This is my first time evaluating her. Her daughter Bing Zelaya is present. She was referred to me for a recent MRI done out of concerns for memory loss. I spoke with Maya herself. Collateral information from page. It sounds like for the past several months her memory has been getting worse such that she is repeating her questions and conversations daily multiple times. She has difficulty doing complex tasks even after being instructed multiple times. She is over buying at the market repetitive items. She got lost going to a friend's house last year that was familiar to her. She lives alone at [facility]. She is in independent living. Her ADLs reportedly are intact. Possibly eating less because of reduced appetite. Walks daily. No mood changes per page. She is on Eliquis for A. fib. Looking at her neurological history, she had an acute stroke last in 2016 in multifocal arterial distributions due to A. fib which is why she is on blood thinners now. MRI brain was done last month showing chronic ischemic changes but no acute process. Review of Systems   Unable to perform ROS: Mental acuity   Psychiatric/Behavioral: Positive for memory loss.        Past Medical History:   Diagnosis Date    Factor V Leiden mutation ([facility] [address]     Hypertension     Hypothyroid     Migraine     Stroke ([facility] [address] 6/07    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    Uterine cancer ([facility] [address] 1994     Family History   Problem Relation Age of Onset    Dementia Mother 62    Cancer Father         colon    Diabetes Father         type I    Stroke Sister      Social History     Socioeconomic History    Marital status:      Spouse name: Not on file    Number of children: Not on file    Years of education: Not on file    Highest education level: Not on file Occupational History    Not on file   Social Needs    Financial resource strain: Not on file    Food insecurity     Worry: Not on file     Inability: Not on file    Transportation needs     Medical: Not on file     Non-medical: Not on file   Tobacco Use    Smoking status: Former Smoker     Packs/day: 25.00     Types: Cigarettes     Quit date: 1985     Years since quittin.3    Smokeless tobacco: Never Used   Substance and Sexual Activity    Alcohol use: Yes     Alcohol/week: 8.0 standard drinks     Types: 8 Standard drinks or equivalent per week     Comment: occasional    Drug use: No    Sexual activity: Never   Lifestyle    Physical activity     Days per week: Not on file     Minutes per session: Not on file    Stress: Not on file   Relationships    Social connections     Talks on phone: Not on file     Gets together: Not on file     Attends Nondenominational service: Not on file     Active member of club or organization: Not on file     Attends meetings of clubs or organizations: Not on file     Relationship status: Not on file    Intimate partner violence     Fear of current or ex partner: Not on file     Emotionally abused: Not on file     Physically abused: Not on file     Forced sexual activity: Not on file   Other Topics Concern    Not on file   Social History Narrative    Not on file     Current Outpatient Medications   Medication Sig    mecobalamin, vitamin B12, 5,000 mcg lozg TAKE ONE (1) TABLET BY MOUTH DAILY.  donepeziL (ARICEPT) 5 mg tablet Take 1 Tab by mouth nightly.  melatonin 3 mg tablet TAKE ONE (1) TABLET BY MOUTH AT BEDTIME FOR INSOMNIA.  metoprolol succinate (TOPROL-XL) 25 mg XL tablet TAKE ONE (1) TABLET BY MOUTH DAILY WITH 50MG TABLET (TOTAL DOSE=75MG).  Eliquis 5 mg tablet TAKE ONE (1) TABLET BY MOUTH 2 TIMES DAILY.  metoprolol succinate (TOPROL-XL) 50 mg XL tablet TAKE ONE (1) TABLET BY MOUTH DAILY WITH 25MG TABLET (TOTAL DOSE = 75MG).     atorvastatin (LIPITOR) 40 mg tablet Take 1 Tab by mouth nightly.  NATURE-THROID 81.25 mg tab Take 1 Tab by mouth nightly. 90mg    acetaminophen (TYLENOL) 500 mg tablet Pain Reliever (acetaminophen) 500 mg tablet   TAKE 2 TABLETS BY MOUTH THREE TIMES DAILY FOR 2 WEEKS FOR NECK PAIN    methocarbamoL (ROBAXIN) 750 mg tablet methocarbamol 750 mg tablet   TAKE 1 TABLET BY MOUTH THREE TIMES A DAY FOR 5 DAYS    betamethasone dipropionate (DIPROSONE) 0.05 % topical cream APPLY EXTERNALLY 2 TIMES A DAY FOR 30 DAYS.  coenzyme q10 (Co Q-10) 10 mg cap Take  by mouth.  losartan (COZAAR) 25 mg tablet Take 1 Tab by mouth daily.  MAGNESIUM GLUCONATE 400 mg by Does Not Apply route daily.  cholecalciferol (VITAMIN D3) 5,000 unit capsule Take 5,000 Units by mouth every Monday, Wednesday, Friday. No current facility-administered medications for this visit. Allergies   Allergen Reactions    Ampicillin Hives    Celebrex [Celecoxib] Hives    Diltiazem Unknown (comments)         Neurologic Exam     Mental Status   Pleasant elderly woman awake and alert. She can follow commands. She knows the date month and year. She can spell ocean forward but she cannot backwards and gets frustrated and she stops. She can compare in contrast items successfully. Cranial Nerves   Cranial nerves II through XII intact. Left facial asymmetry, visual fields intact grossly     Motor Exam   Muscle bulk: decreased    Strength   Strength 5/5 throughout. Sensory Exam   Light touch normal.     Gait, Coordination, and Reflexes     Gait  Gait: normal    Physical Exam   Constitutional: She appears well-developed and well-nourished. Neurological: She has normal strength. Gait normal.   Nursing note and vitals reviewed.     Visit Vitals  BP (!) 130/90 Comment: retaking 130/90   Pulse 73   Ht 5' 4\" (1.626 m)   Wt 110 lb (49.9 kg)   SpO2 97%   BMI 18.88 kg/m²       Lab Results   Component Value Date/Time    WBC 15.8 (H) 04/15/2018 08:52 PM HGB 14.2 04/15/2018 08:52 PM    Hemoglobin (POC) 13.3 08/28/2016 08:47 AM    HCT 42.0 04/15/2018 08:52 PM    Hematocrit (POC) 39 08/28/2016 08:47 AM    PLATELET 772 26/92/0142 08:52 PM    MCV 89.7 04/15/2018 08:52 PM     Lab Results   Component Value Date/Time    Hemoglobin A1c 5.2 08/28/2016 09:28 AM    Glucose 108 (H) 04/15/2018 08:52 PM    Glucose (POC) 85 08/30/2016 08:31 AM    LDL cholesterol See Note 01/14/2010 08:37 AM    LDL, calculated 92.6 08/28/2016 09:23 AM    Creatinine (POC) 0.7 11/10/2020 10:39 AM    Creatinine 0.81 04/15/2018 08:52 PM      Lab Results   Component Value Date/Time    Cholesterol, total 172 08/28/2016 09:23 AM    HDL Cholesterol 60 08/28/2016 09:23 AM    LDL cholesterol See Note 01/14/2010 08:37 AM    LDL, calculated 92.6 08/28/2016 09:23 AM    Triglyceride 97 08/28/2016 09:23 AM    CHOL/HDL Ratio 2.9 08/28/2016 09:23 AM     Lab Results   Component Value Date/Time    ALT (SGPT) 57 04/15/2018 08:52 PM    Alk. phosphatase 75 04/15/2018 08:52 PM    Bilirubin, total 0.6 04/15/2018 08:52 PM    Albumin 4.4 04/15/2018 08:52 PM    Protein, total 8.0 04/15/2018 08:52 PM    INR 1.0 04/15/2018 08:52 PM    Prothrombin time 10.5 04/15/2018 08:52 PM    PLATELET 081 70/36/7774 08:52 PM        CT Results (maximum last 3): Results from [place] encounter on 11/10/20   CTA ABDOMEN PELV W CONT    Narrative INDICATION:  Ascending aortic aneurysm     COMPARISON:  November 2013     TECHNIQUE:   After the rapid bolus administration of 100 cc of Isovue-370, then  thin section helical images were obtained through the chest, abdomen, and  pelvis. 3D image postprocessing and maximum intensity projections were  performed. CT dose reduction was achieved through use of a standardized  protocol tailored for this examination and automatic exposure control for dose  modulation. Adaptive statistical iterative reconstruction (ASIR) was utilized. FINDINGS:  CHEST: Biapical pleural-parenchymal scarring.  No mediastinal lymphadenopathy or  pulmonary consolidation. Heart size is normal. No pleural or pericardial  effusion. No axillary lymphadenopathy. ABDOMEN: Incidental hepatic cysts. Perfusional changes in segment 6 of the  liver. Portal vein is patent. No biliary dilation. Spleen is normal. Left  adrenal nodule measures 25 x 28 mm, stable since 2013. Left upper pole simple  renal cyst. Within the pancreatic neck there is a 9 mm hypodensity which is  likely a sidebranch IPMN. Bowel is normal in caliber. No lymphadenopathy or ascites. PELVIS: Sigmoid colonic diverticulosis. No free fluid in the pelvis. Uterus and  ovaries are not visualized, likely surgically absent. Urinary bladder is normal.  BONES: Laminectomy and posterior fusion at L4-5. VESSELS: Ascending thoracic aortic aneurysm measuring 43 x 42 mm, stable since  November 2013. Descending thoracic aorta is normal in caliber. Abdominal aorta  demonstrates atherosclerotic plaque without aneurysm. Iliac and femoral arteries  are patent. Impression IMPRESSION:  1.  Stable 4.3 x 4.2 cm ascending thoracic aortic aneurysm compared to November 2013.  2.  Stable chronic findings including hepatic cysts, right renal cyst, and a  left adrenal nodule. 3.  9 mm pancreatic neck hypodensity, likely a sidebranch IPMN. CTA CHEST W OR W WO CONT    Narrative INDICATION:  Ascending aortic aneurysm     COMPARISON:  November 2013     TECHNIQUE:   After the rapid bolus administration of 100 cc of Isovue-370, then  thin section helical images were obtained through the chest, abdomen, and  pelvis. 3D image postprocessing and maximum intensity projections were  performed. CT dose reduction was achieved through use of a standardized  protocol tailored for this examination and automatic exposure control for dose  modulation. Adaptive statistical iterative reconstruction (ASIR) was utilized. FINDINGS:  CHEST: Biapical pleural-parenchymal scarring.  No mediastinal lymphadenopathy or  pulmonary consolidation. Heart size is normal. No pleural or pericardial  effusion. No axillary lymphadenopathy. ABDOMEN: Incidental hepatic cysts. Perfusional changes in segment 6 of the  liver. Portal vein is patent. No biliary dilation. Spleen is normal. Left  adrenal nodule measures 25 x 28 mm, stable since 2013. Left upper pole simple  renal cyst. Within the pancreatic neck there is a 9 mm hypodensity which is  likely a sidebranch IPMN. Bowel is normal in caliber. No lymphadenopathy or ascites. PELVIS: Sigmoid colonic diverticulosis. No free fluid in the pelvis. Uterus and  ovaries are not visualized, likely surgically absent. Urinary bladder is normal.  BONES: Laminectomy and posterior fusion at L4-5. VESSELS: Ascending thoracic aortic aneurysm measuring 43 x 42 mm, stable since  November 2013. Descending thoracic aorta is normal in caliber. Abdominal aorta  demonstrates atherosclerotic plaque without aneurysm. Iliac and femoral arteries  are patent. Impression IMPRESSION:  1.  Stable 4.3 x 4.2 cm ascending thoracic aortic aneurysm compared to November 2013.  2.  Stable chronic findings including hepatic cysts, right renal cyst, and a  left adrenal nodule. 3.  9 mm pancreatic neck hypodensity, likely a sidebranch IPMN. MRI Results (maximum last 3): Results from [place] encounter on 04/05/21   MRI BRAIN WO CONT    Narrative EXAM: MRI BRAIN WO CONT    INDICATION: Dementia    COMPARISON: August 2016. CONTRAST: None. TECHNIQUE:    Multiplanar multisequence acquisition without contrast of the brain. FINDINGS:  Diffusion imaging does not show acute ischemic changes. There is no extra-axial fluid collection hemorrhage or shift. There are no  masses. Flow voids in major vessels at the base of the brain are present. Remote  left parietal and occipital infarcts. Mild atrophy and nonspecific white matter  changes.  Extent deformity at the C1-C2 junction possibly chronic. Next      Impression 1. No acute findings no mass. 2. Atrophy white matter disease and remote left side parietal and occipital  ischemia. Results from [place] encounter on 03/22/18   MRI HIP  RT  WO CONT    Narrative EXAM:  MRI HIP  RT  WO CONT    INDICATION: Right hip pain and right groin pain for one year without injury. COMPARISON: Pelvis view on 2/2/2009    TECHNIQUE: Coronal T1 and axial T2 fat-saturated MRI of the whole pelvis; axial  and sagittal T2 fat-saturated; coronal proton density fat-saturated MRI of the  right hip . CONTRAST: None. FINDINGS: Bone marrow: Degenerative bone marrow edema is moderate in the  anterior, superior right acetabulum. Metal artifact arises from lumbar spine  fusion hardware. Fat saturation is heterogeneous in the proximal sacrum. There  is no acute fracture or osteonecrosis. Joint fluid: Bilateral hip joint fluid is physiologic. Articular cartilage: Heterogeneous high-grade partial-thickness and  full-thickness loss of the articular cartilage from the anterior weightbearing  right hip joint. No measurable osteochondral lesion. Acetabular labrum: Age-appropriate nondisplaced degeneration of the right  acetabular labrum. Hip morphology: Normal concavity of the femoral head-neck junction. No  acetabular overcoverage or retroversion. Tendons: Mild bilateral hamstring origin tendinosis. No full-thickness tendon  tear. Muscles: Mild diffuse atrophy. No focal edema. Soft tissue mass: None. Lobulated, septated, heterogeneous cystic lesion  anterior, medial to the distal iliopsoas tendon measures 3.0 x 2.9 x 5.3 cm and  appears to arise from the inferior margin of the right hip joint. There is  anterior displacement of the femoral neurovascular bundle. Intrapelvic soft tissues: No acute process. Diverticulosis of the colon is  partially imaged.       Impression IMPRESSION:   1. 3.0 x 2.9 x 5.3 cm complicated, septated cyst anterior to the right hip joint  most likely represents a nonacute joint related ganglion cyst. Venous varix is  less likely. Pseudoaneurysm from the adjacent femoral artery is unlikely. Prior  to any intervention, consider ultrasound to evaluate for internal blood flow. 2. Moderate-severe right hip osteoarthritis. Degenerative bone marrow edema in  the right acetabulum contributes to pain. 3. No fracture. Results from Hospital Encounter encounter on 08/28/16   MRI BRAIN WO CONT    Narrative INDICATION: stroke Left-sided headache dizziness. Slurred speech Prior history  of stroke 2007 and 2005 with right-sided weakness. COMPARISON: CT 8/30/2016    EXAM: Sagittal T1-weighted spin-echo, axial FLAIR and T2-weighted fast  spin-echo, axial diffusion weighted echo planar, axial T1-weighted spin-echo,  axial gradient echo, and coronal T2-weighted fast spin-echo MR images of the  brain are obtained. FINDINGS: There are several areas of restricted diffusion including the  superomedial left thalamus, several areas in the left occipital lobe, anterior  in the inferoposterior right occipital lobe and adjacent right temporal lobe, in  the superior right parietal lobe. There is also a small focus of restricted  diffusion in the right cerebral hemisphere. The findings, given their multiple  locations, which suggest an embolic source as causative. Numerous foci of nonspecific white matter signal alteration demonstrated in the  cerebrum and in the central zuleima. There is diminished flow void in the left  vertebral artery at the foramen magnum suggesting thrombus. There is also  increased signal shown centrally on the T1-weighted images within the central  basilar artery suggesting slow flow or thrombus. The ventricles and cortical  sulci are normal in size and contour. Sella, optic chiasm, orbits and paranasal  sinuses are normal except for mild mucosal thickening in the inferior left  maxillary sinus. .      Impression IMPRESSION: Multifocal areas of acute infarction including left thalamus, left  greater than right occipital lobes bilaterally, and right cerebellar hemisphere. PET Results (maximum last 3): No results found for this or any previous visit. Assessment and Plan   Diagnoses and all orders for this visit:    1. Memory loss  -     REFERRAL TO OCCUPATIONAL THERAPY  -     PET BRAIN METABOLIC EVAL (INI); Future  -     VITAMIN B12 & FOLATE; Future    2. Chronic cerebral ischemia  -     PET BRAIN METABOLIC EVAL (INI); Future    Other orders  -     donepeziL (ARICEPT) 5 mg tablet; Take 1 Tab by mouth nightly. 58-year-old woman who presents with memory changes. I am concerned she is developing early dementia. The differential being either Alzheimer's versus vascular dementia or combination thereof. She certainly has risk for both. I am going to have her complete a driving evaluation to determine her safety with driving. I do think her days of driving are numbered and I discussed this with her specifically. She should not drive until her evaluation is done and verified as being successful if it is. I am also in to have her try to do a PET scan to help clarify her diagnosis whether or not there is any pattern particular to Alzheimer's or not. Start Aricept now with the intention of optimizing treatment as much as possible and slowing disease progression. Check a B12 level since her appetite has been suffering. I discussed my concerns with her and her daughter personally. Defer neuropsych testing at this point as it is not going to change our management imminently. At some point we may need to have this done for medical legal purposes if required. She understands the plan and I will see her in follow-up.   60 minutes of time was spent reviewing the medical record today, reviewing the neuroimaging done in April, face-to-face time with the patient and her daughter to include collateral information gathering, completion of documentation. I reviewed and decided to continue the current medications. This clinical note was dictated with an electronic dictation software that can make unintentional errors. If there are any questions, please contact me directly for clarification. A notice of this visit/encounter being completed has been sent electronically to the patient's PCP and/or referring provider.      2 [facility], [facility] [address]  Diplomate LEEN

## 2021-05-11 LAB
FOLATE SERPL-MCNC: 9.4 NG/ML
VIT B12 SERPL-MCNC: >2000 PG/ML (ref 232–1245)

## 2021-05-13 ENCOUNTER — TELEPHONE (OUTPATIENT)
Dept: NEUROLOGY | Age: 81
End: 2021-05-13

## 2021-05-13 NOTE — TELEPHONE ENCOUNTER
Patients daughter calling stating that Humera Bridges was going to order her mom a driving test and the daughter stated that she doesn't want to have the driving test now the daughter and the son already took her keys and dont want her driving any more.  Please call her back

## 2021-05-25 ENCOUNTER — TELEPHONE (OUTPATIENT)
Dept: NEUROLOGY | Age: 81
End: 2021-05-25

## 2021-05-28 RX ORDER — METOPROLOL SUCCINATE 25 MG/1
TABLET, EXTENDED RELEASE ORAL
Qty: 30 TABLET | Refills: 6 | Status: SHIPPED | OUTPATIENT
Start: 2021-05-28

## 2021-05-28 NOTE — TELEPHONE ENCOUNTER
Requested Prescriptions     Signed Prescriptions Disp Refills    metoprolol succinate (TOPROL-XL) 25 mg XL tablet 30 Tablet 6     Sig: TAKE ONE (1) TABLET BY MOUTH DAILY WITH 50MG TABLET (TOTAL DOSE=75MG).      Authorizing Provider: Chuckie Mcdowell     Ordering User: Annel Hay       Last office visit 4/30/21    Per Dr. Efrain Chilel   Date Time Provider Eliceo Rodas   8/11/2021 10:20 AM Julián Dominguez DO [facility] BS AMB   10/29/2021  8:40 AM MD ROSARIO Adams BS AMB

## 2021-10-29 ENCOUNTER — OFFICE VISIT (OUTPATIENT)
Dept: CARDIOLOGY CLINIC | Age: 81
End: 2021-10-29
Payer: MEDICARE

## 2021-10-29 VITALS
SYSTOLIC BLOOD PRESSURE: 132 MMHG | OXYGEN SATURATION: 99 % | DIASTOLIC BLOOD PRESSURE: 80 MMHG | HEIGHT: 64 IN | BODY MASS INDEX: 19.12 KG/M2 | WEIGHT: 112 LBS | HEART RATE: 68 BPM | RESPIRATION RATE: 16 BRPM

## 2021-10-29 DIAGNOSIS — I10 BENIGN ESSENTIAL HTN: ICD-10-CM

## 2021-10-29 DIAGNOSIS — I48.0 PAROXYSMAL A-FIB (HCC): ICD-10-CM

## 2021-10-29 DIAGNOSIS — I35.1 AORTIC VALVE INSUFFICIENCY, ETIOLOGY OF CARDIAC VALVE DISEASE UNSPECIFIED: Primary | ICD-10-CM

## 2021-10-29 DIAGNOSIS — Z87.891 HISTORY OF TOBACCO USE: ICD-10-CM

## 2021-10-29 DIAGNOSIS — I71.21 ASCENDING AORTIC ANEURYSM: ICD-10-CM

## 2021-10-29 PROCEDURE — 1090F PRES/ABSN URINE INCON ASSESS: CPT | Performed by: INTERNAL MEDICINE

## 2021-10-29 PROCEDURE — G8536 NO DOC ELDER MAL SCRN: HCPCS | Performed by: INTERNAL MEDICINE

## 2021-10-29 PROCEDURE — 93000 ELECTROCARDIOGRAM COMPLETE: CPT | Performed by: INTERNAL MEDICINE

## 2021-10-29 PROCEDURE — 99214 OFFICE O/P EST MOD 30 MIN: CPT | Performed by: INTERNAL MEDICINE

## 2021-10-29 PROCEDURE — G8427 DOCREV CUR MEDS BY ELIG CLIN: HCPCS | Performed by: INTERNAL MEDICINE

## 2021-10-29 PROCEDURE — G8510 SCR DEP NEG, NO PLAN REQD: HCPCS | Performed by: INTERNAL MEDICINE

## 2021-10-29 PROCEDURE — G8399 PT W/DXA RESULTS DOCUMENT: HCPCS | Performed by: INTERNAL MEDICINE

## 2021-10-29 PROCEDURE — G8754 DIAS BP LESS 90: HCPCS | Performed by: INTERNAL MEDICINE

## 2021-10-29 PROCEDURE — G8420 CALC BMI NORM PARAMETERS: HCPCS | Performed by: INTERNAL MEDICINE

## 2021-10-29 PROCEDURE — G8752 SYS BP LESS 140: HCPCS | Performed by: INTERNAL MEDICINE

## 2021-10-29 PROCEDURE — 1101F PT FALLS ASSESS-DOCD LE1/YR: CPT | Performed by: INTERNAL MEDICINE

## 2021-10-29 NOTE — LETTER
Patient:  Obdulio King   YOB: 1940  Date of Visit: 10/29/2021      Dear Maren Barth MD  [phone]  Via Fax: [phone]:    Ms. Carina Staples is a 79 yo F with remote history of tobacco, CVA in 11/2015 with at the time right sided deficits, hypertension, seen for moderate to severe aortic regurgitation on echo, afib on BB for rate control and eliquis for [address]. 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 On her echocardiogram in 12/2015, she did not have any wall motion abnormality and had preserved LV function. Echo 11/2020 noted normal LV function with moderate aortic regurgitation, ascending aorta at 4.4 cm. CT later showed the ascending aorta was 4.3 cm at its widest dimension and the radiologist compared this to a November 2013 study and this was unchanged.      Since her last visit, she has been doing okay. She denies any palpitations, chest pain, shortness of breath. She did see neurology back in May for concern for early dementia. They felt maybe this was Alzheimer's versus vascular dementia. There is some concern because when I asked if she was taking her medications she was not sure that she was taking any medication. Later she said that she was going to call us and let us know what medication she is taking and will verify this. She is compensated on exam with clear lungs and no lower extremity edema. Soc hx. Tob quit 25 yo. , resides [facility]  Assessment and Plan:   1. Ascending aortic aneurysm. It has been 4.4 cm by echocardiogram and unchanged and 4.3 cm by CT scan. Will have her follow up with a same day echocardiogram in six months and she has been getting an echocardiogram annually for surveillance. 2. Aortic regurgitation.   It has been in the moderate range. 3. Paroxysmal atrial fibrillation. Stable in sinus rhythm on Metoprolol. 4. Chronic anticoagulation. No bleeding issues on Eliquis. She will verify her medications. 5. Essential hypertension. Blood pressure is controlled. 6. Remote tobacco use. 7. History of CVA in 2015. If you have questions, please do not hesitate to call me.      Sincerely,      Tamera Oneil MD

## 2021-10-29 NOTE — PROGRESS NOTES
[facility]: [place]  [phone]    History of Present Illness:    Ms. Bimal Martinez is a 79 yo F with remote history of tobacco, CVA in 11/2015 with at the time right sided deficits, hypertension, seen for moderate to severe aortic regurgitation on echo, afib on BB for rate control and eliquis for [address]. 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 On her echocardiogram in 12/2015, she did not have any wall motion abnormality and had preserved LV function. Echo 11/2020 noted normal LV function with moderate aortic regurgitation, ascending aorta at 4.4 cm. CT later showed the ascending aorta was 4.3 cm at its widest dimension and the radiologist compared this to a November 2013 study and this was unchanged.      Since her last visit, she has been doing okay. She denies any palpitations, chest pain, shortness of breath. She did see neurology back in May for concern for early dementia. They felt maybe this was Alzheimer's versus vascular dementia. There is some concern because when I asked if she was taking her medications she was not sure that she was taking any medication. Later she said that she was going to call us and let us know what medication she is taking and will verify this. She is compensated on exam with clear lungs and no lower extremity edema. Soc hx. Tob quit 27 yo. , resides [facility] living  Assessment and Plan:   1. Ascending aortic aneurysm. It has been 4.4 cm by echocardiogram and unchanged and 4.3 cm by CT scan. Will have her follow up with a same day echocardiogram in six months and she has been getting an echocardiogram annually for surveillance. 2. Aortic regurgitation. It has been in the moderate range. 3. Paroxysmal atrial fibrillation. Stable in sinus rhythm on Metoprolol.     4. Chronic anticoagulation. No bleeding issues on Eliquis. She will verify her medications. 5. Essential hypertension. Blood pressure is controlled. 6. Remote tobacco use. 7. History of CVA in 2015. She  has a past medical history of Factor V Leiden mutation [facility]), Hypertension, Hypothyroid, Migraine, Stroke ([facility] (6/07), and Uterine cancer ([facility] (1994). All other systems negative except as above. PE  Vitals:    10/29/21 0911   BP: 132/80   Pulse: 68   Resp: 16   SpO2: 99%   Weight: 112 lb (50.8 kg)   Height: 5' 4\" (1.626 m)    Body mass index is 19.22 kg/m².    General appearance - alert, well appearing, and in no distress  Mental status - affect appropriate to mood  Eyes - sclera anicteric, moist mucous membranes  Neck - supple, no JVD  Chest - clear to auscultation, no wheezes, rales or rhonchi  Heart - normal rate, regular rhythm, normal S1, S2, I-II/VI systolic murmur LUSB  Abdomen - soft, nontender, nondistended, no masses or organomegaly  Extremities - peripheral pulses normal, no pedal edema      Recent Labs:  Lab Results   Component Value Date/Time    Cholesterol, total 172 08/28/2016 09:23 AM    HDL Cholesterol 60 08/28/2016 09:23 AM    LDL cholesterol See Note 01/14/2010 08:37 AM    LDL, calculated 92.6 08/28/2016 09:23 AM    Triglyceride 97 08/28/2016 09:23 AM    CHOL/HDL Ratio 2.9 08/28/2016 09:23 AM     Lab Results   Component Value Date/Time    Creatinine (POC) 0.7 11/10/2020 10:39 AM    Creatinine 0.81 04/15/2018 08:52 PM     Lab Results   Component Value Date/Time    BUN 18 04/15/2018 08:52 PM    BUN (POC) 13 08/28/2016 08:47 AM     Lab Results   Component Value Date/Time    Potassium 4.0 04/15/2018 08:52 PM     Lab Results   Component Value Date/Time    Hemoglobin A1c 5.2 08/28/2016 09:28 AM     Lab Results   Component Value Date/Time    Hemoglobin (POC) 13.3 08/28/2016 08:47 AM    HGB 14.2 04/15/2018 08:52 PM     Lab Results   Component Value Date/Time    PLATELET 993 04/15/2018 08:52 PM       Reviewed:  Past Medical History:   Diagnosis Date    Factor V Leiden mutation ([facility]     Hypertension     Hypothyroid     Migraine     Stroke ([facility]     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    Uterine cancer ([facility]      Social History     Tobacco Use   Smoking Status Former Smoker    Packs/day: 25.00    Types: Cigarettes    Quit date: 1985    Years since quittin.8   Smokeless Tobacco Never Used     Social History     Substance and Sexual Activity   Alcohol Use Yes    Alcohol/week: 8.0 standard drinks    Types: 8 Standard drinks or equivalent per week    Comment: occasional     Allergies   Allergen Reactions    Ampicillin Hives    Celebrex [Celecoxib] Hives    Diltiazem Unknown (comments)       Current Outpatient Medications   Medication Sig    metoprolol succinate (TOPROL-XL) 25 mg XL tablet TAKE ONE (1) TABLET BY MOUTH DAILY WITH 50MG TABLET (TOTAL DOSE=75MG). (Patient not taking: Reported on 10/29/2021)    acetaminophen (TYLENOL) 500 mg tablet Pain Reliever (acetaminophen) 500 mg tablet   TAKE 2 TABLETS BY MOUTH THREE TIMES DAILY FOR 2 WEEKS FOR NECK PAIN (Patient not taking: Reported on 10/29/2021)    mecobalamin, vitamin B12, 5,000 mcg lozg TAKE ONE (1) TABLET BY MOUTH DAILY. (Patient not taking: Reported on 10/29/2021)    methocarbamoL (ROBAXIN) 750 mg tablet methocarbamol 750 mg tablet   TAKE 1 TABLET BY MOUTH THREE TIMES A DAY FOR 5 DAYS (Patient not taking: Reported on 10/29/2021)    donepeziL (ARICEPT) 5 mg tablet Take 1 Tab by mouth nightly. (Patient not taking: Reported on 10/29/2021)    betamethasone dipropionate (DIPROSONE) 0.05 % topical cream APPLY EXTERNALLY 2 TIMES A DAY FOR 30 DAYS. (Patient not taking: Reported on 10/29/2021)    melatonin 3 mg tablet TAKE ONE (1) TABLET BY MOUTH AT BEDTIME FOR INSOMNIA. (Patient not taking: Reported on 10/29/2021)    Eliquis 5 mg tablet TAKE ONE (1) TABLET BY MOUTH 2 TIMES DAILY.  (Patient not taking: Reported on 10/29/2021)    coenzyme q10 (Co Q-10) 10 mg cap Take  by mouth. (Patient not taking: Reported on 10/29/2021)    metoprolol succinate (TOPROL-XL) 50 mg XL tablet TAKE ONE (1) TABLET BY MOUTH DAILY WITH 25MG TABLET (TOTAL DOSE = 75MG). (Patient not taking: Reported on 10/29/2021)    losartan (COZAAR) 25 mg tablet Take 1 Tab by mouth daily. (Patient not taking: Reported on 10/29/2021)    atorvastatin (LIPITOR) 40 mg tablet Take 1 Tab by mouth nightly. (Patient not taking: Reported on 10/29/2021)    MAGNESIUM GLUCONATE 400 mg by Does Not Apply route daily. (Patient not taking: Reported on 10/29/2021)    NATURE-THROID 81.25 mg tab Take 1 Tab by mouth nightly. 90mg (Patient not taking: Reported on 10/29/2021)    cholecalciferol (VITAMIN D3) 5,000 unit capsule Take 5,000 Units by mouth every Monday, Wednesday, Friday. (Patient not taking: Reported on 10/29/2021)     No current facility-administered medications for this visit.        Giana Edmond MD  [facility] heart and Vascular [place]  [facility] [address] [facility]  [address]

## 2022-03-19 PROBLEM — I48.0 PAROXYSMAL A-FIB (HCC): Status: ACTIVE | Noted: 2018-04-18

## 2022-03-19 PROBLEM — Z86.73 HISTORY OF STROKE: Status: ACTIVE | Noted: 2017-07-25

## 2022-03-19 PROBLEM — Z79.01 CHRONIC ANTICOAGULATION: Status: ACTIVE | Noted: 2018-04-18

## 2022-03-19 PROBLEM — I35.1 MODERATE AORTIC REGURGITATION: Status: ACTIVE | Noted: 2017-07-25

## 2022-03-19 PROBLEM — Z87.891 HISTORY OF TOBACCO USE: Status: ACTIVE | Noted: 2017-07-25

## 2022-03-19 PROBLEM — Z71.89 ADVANCE DIRECTIVE DISCUSSED WITH PATIENT: Status: ACTIVE | Noted: 2017-06-16

## 2022-03-19 PROBLEM — I35.1 AORTIC VALVE REGURGITATION: Status: ACTIVE | Noted: 2018-04-18

## 2022-03-20 PROBLEM — I10 BENIGN ESSENTIAL HTN: Status: ACTIVE | Noted: 2017-07-25

## 2022-09-19 ENCOUNTER — APPOINTMENT (OUTPATIENT)
Dept: CT IMAGING | Age: 82
End: 2022-09-19
Attending: STUDENT IN AN ORGANIZED HEALTH CARE EDUCATION/TRAINING PROGRAM
Payer: MEDICARE

## 2022-09-19 ENCOUNTER — HOSPITAL ENCOUNTER (EMERGENCY)
Age: 82
Discharge: HOME | End: 2022-09-20
Attending: STUDENT IN AN ORGANIZED HEALTH CARE EDUCATION/TRAINING PROGRAM
Payer: MEDICARE

## 2022-09-19 DIAGNOSIS — S01.511A LIP LACERATION, INITIAL ENCOUNTER: Primary | ICD-10-CM

## 2022-09-19 PROCEDURE — 72125 CT NECK SPINE W/O DYE: CPT

## 2022-09-19 PROCEDURE — 99284 EMERGENCY DEPT VISIT MOD MDM: CPT

## 2022-09-19 PROCEDURE — 75810000293 HC SIMP/SUPERF WND  RPR

## 2022-09-19 PROCEDURE — 70450 CT HEAD/BRAIN W/O DYE: CPT

## 2022-09-20 VITALS
OXYGEN SATURATION: 99 % | WEIGHT: 120 LBS | HEART RATE: 83 BPM | TEMPERATURE: 97.7 F | SYSTOLIC BLOOD PRESSURE: 146 MMHG | RESPIRATION RATE: 17 BRPM | DIASTOLIC BLOOD PRESSURE: 86 MMHG | BODY MASS INDEX: 19.29 KG/M2 | HEIGHT: 66 IN

## 2022-09-20 NOTE — ED NOTES
[facility] called for transport ETA [id number] along with facility called and updated spoke with RN at [facility], [facility].

## 2022-09-20 NOTE — ED PROVIDER NOTES
EMERGENCY DEPARTMENT HISTORY AND PHYSICAL EXAM      Date: 9/19/2022  Patient Name: Carlos Cade    History of Presenting Illness     Chief Complaint   Patient presents with    Fall     Per EMS nursing home reported ground level fall hitting face-lac to bottom inner lip, on blood thinner, -loc. Pt unable to report details of events just needed to go to the ED-hx dementia, pt denies any pain. HPI: Carlos Cade, 78 y.o. female presents to the ED with cc of fall. Patient comes from living facility, documentation sent from there is says that she tripped over her shoes and fell at 7:55 PM.  She says that she got up and fell onto her face. She denies loss of consciousness, denies headache, reports some pain in her lower lip. No weakness or numbness of the arms or legs, no pain in the neck or back. She takes Eliquis. He is up-to-date on immunizations. There are no other complaints, changes, or physical findings at this time. PCP: Elizabeth Morris MD    No current facility-administered medications on file prior to encounter. Current Outpatient Medications on File Prior to Encounter   Medication Sig Dispense Refill    metoprolol succinate (TOPROL-XL) 25 mg XL tablet TAKE ONE (1) TABLET BY MOUTH DAILY WITH 50MG TABLET (TOTAL DOSE=75MG). (Patient not taking: Reported on 10/29/2021) 30 Tablet 6    acetaminophen (TYLENOL) 500 mg tablet Pain Reliever (acetaminophen) 500 mg tablet   TAKE 2 TABLETS BY MOUTH THREE TIMES DAILY FOR 2 WEEKS FOR NECK PAIN (Patient not taking: Reported on 10/29/2021)      mecobalamin, vitamin B12, 5,000 mcg lozg TAKE ONE (1) TABLET BY MOUTH DAILY. (Patient not taking: Reported on 10/29/2021)      methocarbamoL (ROBAXIN) 750 mg tablet methocarbamol 750 mg tablet   TAKE 1 TABLET BY MOUTH THREE TIMES A DAY FOR 5 DAYS (Patient not taking: Reported on 10/29/2021)      donepeziL (ARICEPT) 5 mg tablet Take 1 Tab by mouth nightly.  (Patient not taking: Reported on 10/29/2021) 90 Tab 1    betamethasone dipropionate (DIPROSONE) 0.05 % topical cream APPLY EXTERNALLY 2 TIMES A DAY FOR 30 DAYS. (Patient not taking: Reported on 10/29/2021)      melatonin 3 mg tablet TAKE ONE (1) TABLET BY MOUTH AT BEDTIME FOR INSOMNIA. (Patient not taking: Reported on 10/29/2021)      Eliquis 5 mg tablet TAKE ONE (1) TABLET BY MOUTH 2 TIMES DAILY. (Patient not taking: Reported on 10/29/2021) 180 Tab 1    coenzyme q10 (Co Q-10) 10 mg cap Take  by mouth. (Patient not taking: Reported on 10/29/2021)      metoprolol succinate (TOPROL-XL) 50 mg XL tablet TAKE ONE (1) TABLET BY MOUTH DAILY WITH 25MG TABLET (TOTAL DOSE = 75MG). (Patient not taking: Reported on 10/29/2021) 30 Tab 5    losartan (COZAAR) 25 mg tablet Take 1 Tab by mouth daily. (Patient not taking: Reported on 10/29/2021) 30 Tab 11    atorvastatin (LIPITOR) 40 mg tablet Take 1 Tab by mouth nightly. (Patient not taking: Reported on 10/29/2021) 90 Tab 3    MAGNESIUM GLUCONATE 400 mg by Does Not Apply route daily. (Patient not taking: Reported on 10/29/2021)      NATURE-THROID 81.25 mg tab Take 1 Tab by mouth nightly. 90mg (Patient not taking: Reported on 10/29/2021)      cholecalciferol (VITAMIN D3) 5,000 unit capsule Take 5,000 Units by mouth every Monday, Wednesday, Friday.  (Patient not taking: Reported on 10/29/2021)         Past History     Past Medical History:  Past Medical History:   Diagnosis Date    Factor V Leiden mutation ([facility]     Hypertension     Hypothyroid     Migraine     Stroke ([facility] 6/07    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    Uterine cancer ([facility] 1994       Past Surgical History:  Past Surgical History:   Procedure Laterality Date    HX CATARACT REMOVAL  10/09    right    HX HENNY AND BSO      for uterine cancer    AK BREAST SURGERY PROCEDURE UNLISTED  4/02, 3/01    L lumpectomy - atypical cells       Family History:  Family History   Problem Relation Age of Onset    Dementia Mother 62    Cancer Father         colon    Diabetes Father         type I    Stroke Sister        Social History:  Social History     Tobacco Use    Smoking status: Former     Packs/day: 25.00     Types: Cigarettes     Quit date: 1985     Years since quittin.7    Smokeless tobacco: Never   Vaping Use    Vaping Use: Never used   Substance Use Topics    Alcohol use: Yes     Alcohol/week: 8.0 standard drinks     Types: 8 Standard drinks or equivalent per week     Comment: occasional    Drug use: No       Allergies: Allergies   Allergen Reactions    Ampicillin Hives    Celebrex [Celecoxib] Hives    Diltiazem Unknown (comments)    Estrace [Estradiol] Rash     On nursing home paperwork         Review of Systems   no fever  No ear pain  No eye pain  Reports lower lip pain  no shortness of breath  no chest pain  no abdominal pain  no dysuria  no leg pain  No rash  No lymphadenopathy  No weight loss    Physical Exam   Physical Exam  Constitutional:       General: She is not in acute distress. Appearance: She is not toxic-appearing. HENT:      Head: Normocephalic. Comments: Approximately 1 cm horizontal linear laceration over the lower lip, does not cross the vermilion border, there are gaping of wound edges with some adipose tissue visible. Over the chin, there is approximately 0.25 cm linear laceration, slight gaping of wound edges, tracks through and through to the inner mucosa where there is another 0.25 cm linear laceration over the inner oral mucosa. No tenderness of the teeth, no loose teeth, no other intraoral trauma  Eyes:      Extraocular Movements: Extraocular movements intact. Cardiovascular:      Rate and Rhythm: Normal rate and regular rhythm. Pulmonary:      Effort: Pulmonary effort is normal.      Breath sounds: Normal breath sounds. Abdominal:      Palpations: Abdomen is soft. Tenderness: There is no abdominal tenderness. Musculoskeletal:         General: No tenderness or deformity. Cervical back: Neck supple. Comments: No tenderness of the midline spine, no tenderness or deformities of the extremities   Skin:     General: Skin is warm and dry. Neurological:      General: No focal deficit present. Mental Status: She is alert. Cranial Nerves: No cranial nerve deficit. Comments: 5/5 strength with bicep flexion and extension bilaterally, 5/5 strength with ankle flexion and extension bilaterally. Sensation to light touch intact over upper and lower extremities bilaterally. Psychiatric:         Mood and Affect: Mood normal.       Diagnostic Study Results     Labs -   No results found for this or any previous visit (from the past 24 hour(s)). Radiologic Studies -   CT HEAD WO CONT   Final Result   No acute intracranial process. Chronic small vessel ischemic changes. Old left   frontal and right occipital infarcts. CT SPINE CERV WO CONT   Final Result   There is no acute fracture or dislocation. There is multilevel degenerative change with moderate bilateral foraminal   stenosis at C6-7. Moderate right foraminal stenosis at C3-4 and C5-6. Additional, less severe degenerative findings are as described in detail above. CT Results  (Last 48 hours)                 09/19/22 2232  CT HEAD WO CONT Final result    Impression:  No acute intracranial process. Chronic small vessel ischemic changes. Old left   frontal and right occipital infarcts. Narrative:  EXAM: CT HEAD WO CONT       INDICATION: fall       COMPARISON: August 31, 2016. CONTRAST: None. TECHNIQUE: Unenhanced CT of the head was performed using 5 mm images. Brain and   bone windows were generated. Coronal and sagittal reformats. CT dose reduction   was achieved through use of a standardized protocol tailored for this   examination and automatic exposure control for dose modulation. FINDINGS:   The ventricles and sulci are stable in size, shape and configuration. . There is   moderate periventricular white matter disease, compatible with chronic small   vessel ischemia. Small foci of encephalomalacia seen in the left frontal lobe   and right occipital lobe, compatible with old infarcts. There is no intracranial   hemorrhage, extra-axial collection, or mass effect. The basilar cisterns are   open. No CT evidence of acute infarct. The bone windows demonstrate no abnormalities. The visualized portions of the   paranasal sinuses and mastoid air cells are clear.           09/19/22 2232  CT SPINE CERV WO CONT Final result    Impression:  There is no acute fracture or dislocation. There is multilevel degenerative change with moderate bilateral foraminal   stenosis at C6-7. Moderate right foraminal stenosis at C3-4 and C5-6. Additional, less severe degenerative findings are as described in detail above. Narrative:  EXAM:  CT CERVICAL SPINE WITHOUT CONTRAST   Clinical history: Status post fall   INDICATION: fall. COMPARISON: 2014       CONTRAST:  None. TECHNIQUE: Multislice helical CT of the cervical spine was performed without   intravenous contrast administration. Sagittal and coronal reformats were   generated. CT dose reduction was achieved through use of a standardized   protocol tailored for this examination and automatic exposure control for dose   modulation. FINDINGS:       There is minimal anterolisthesis of C3 on C4 and C4 on C5. There is no fracture   or compression deformity. The odontoid process is intact. There are vertebral   vascular calcifications. There is retrodental pannus. There is apical pleural   thickening and scar on the right. The incidentally imaged soft tissues are within normal limits. C2-C3: Facet arthropathy and hypertrophy is severe on the left. Loss of disc   height. Canal is patent. Foramina are patent. C3-C4: Facet arthropathy and anterolisthesis. Canal is patent.  Moderate right   foraminal stenosis and mild to moderate left foraminal stenosis. [place]admin[place] C4-C5: Facet arthropathy is moderate to severe on the right. Loss of disc   height. Canal is patent. Mild right foraminal stenosis. Padmin[place] C5-C6: Facet arthropathy and hypertrophy is greater on the right. Canal is   patent. Moderate right foraminal stenosis. Padmin[place] C6-C7: Loss of disc height. Circumferential bulge/osteophyte. Mild canal   stenosis. Moderate bilateral foraminal stenoses. .       C7-T1: There is no spinal canal or neural foraminal stenosis. CXR Results  (Last 48 hours)      None              Medical Decision Making   I am the first provider for this patient. I reviewed the vital signs, available nursing notes, past medical history, past surgical history, family history and social history. Vital Signs-Reviewed the patient's vital signs. Patient Vitals for the past 24 hrs:   Temp Pulse Resp BP SpO2   09/19/22 2150 -- -- -- (!) 167/103 97 %   09/19/22 2135 -- -- -- (!) 157/110 97 %   09/19/22 2120 98.7 °F (37.1 °C) 83 17 (!) 181/116 97 %         Provider Notes (Medical Decision Making):   78-year-old female presenting with lip pain after mechanical fall. She has lacerations to the lower lip including through and through laceration. Given her age, will obtain CT head. Her neurologic exam is otherwise at baseline. No other significant injuries evident on exam.    ED Course:     Initial assessment performed. The patients presenting problems have been discussed, and they are in agreement with the care plan formulated and outlined with them. I have encouraged them to ask questions as they arise throughout their visit. CT head shows no acute intracranial process, CT cervical spine shows no acute fracture or dislocation. Procedure Note - Laceration Repair:  11:05 PM  Procedure by myself.   1cm linear laceration to lip and 0.25cm laceration to inner mucosa and 0.25 cm laceration to chin was irrigated copiously with NS under jet lavage, and draped in a sterile fashion. The areas were anesthetized with a total of 3 mLs of  Lidocaine 1% with epinephrine via local infiltration of 3 mL lidocaine 1% with epinephrine. The wounds were explored with the following results: No foreign bodies found. The 1 cm wound was repaired with 2 simple interrupted 4-0 absorbable sutures. The inner mucosa of the through and through wound was repaired with 1 simple interrupted 4-0 absorbable suture, and the outer skin layer of the through and through laceration was repaired with 1 simple interrupted 4-0 absorbable suture. The wound was closed with good hemostasis and approximation. [place] border intact: yes   Estimated blood loss: 3cc  The procedure took 1-15 minutes, and pt tolerated well. Patient denies complaints on reevaluation. Patient is counseled on supportive care and return precautions. Will return to the ED for any worsening pain, redness, swelling, purulence, fevers, headaches, changes in mentation, or any new or worrisome symptoms. Will followup with primary care doctor within 2 days. Critical Care Time:         Disposition:  Home    PLAN:  1. Current Discharge Medication List        2.    Follow-up Information       Follow up With Specialties Details Why Contact Info    Darrian Bennett MD Family Medicine In 2 days For wound re-check [phone] [phone]      \[facility] [place] [facility]\"" EMERGENCY DEPT Emergency Medicine  As needed, If symptoms worsen [address] [place] [address]  [phone]          Return to ED if worse     Diagnosis     Clinical Impression: Acute lower lip lacerations

## 2022-09-20 NOTE — ED NOTES
Pt not leaving monitor equipment on, removed at this time to try to alleviate agitation. Pt up to bedside commode and then placed on bed alarm, due to trying to repeatedly get out of bed.

## 2022-09-20 NOTE — ED NOTES
Pt awake and wanting to know where ride is, explained should be here in about 30 minutes, pt to bedside commode and then assisted back in bed, alarm still in place.

## 2022-09-20 NOTE — ED NOTES
Pt repeatedly trying to get out of bed stating \"I am going home now\", bed alarm remains in place and attempted to explain to pt waiting on ride back to facility, pt not remembering previous conversations, additional drinks given-pt denies wanting any food.

## 2022-09-20 NOTE — ED NOTES
Assumed care of pt. Per EMS nursing home reported pt tripped over shoes falling-hit head, lac to bottom inner lip-slightly bleeding currently, no loc, +thinner. Pt denies any pain, AOX3-uncertain of situation-hx dementia, PWD, hypertensive.